# Patient Record
Sex: MALE
[De-identification: names, ages, dates, MRNs, and addresses within clinical notes are randomized per-mention and may not be internally consistent; named-entity substitution may affect disease eponyms.]

---

## 2017-11-22 ENCOUNTER — HOSPITAL ENCOUNTER (INPATIENT)
Dept: HOSPITAL 31 - C.ER | Age: 62
LOS: 12 days | Discharge: HOME | DRG: 541 | End: 2017-12-04
Attending: INTERNAL MEDICINE | Admitting: INTERNAL MEDICINE
Payer: MEDICAID

## 2017-11-22 VITALS — BODY MASS INDEX: 31.3 KG/M2

## 2017-11-22 DIAGNOSIS — I26.99: Primary | ICD-10-CM

## 2017-11-22 DIAGNOSIS — F10.10: ICD-10-CM

## 2017-11-22 DIAGNOSIS — I82.431: ICD-10-CM

## 2017-11-22 DIAGNOSIS — I27.20: ICD-10-CM

## 2017-11-22 DIAGNOSIS — I10: ICD-10-CM

## 2017-11-22 DIAGNOSIS — Z79.01: ICD-10-CM

## 2017-11-22 LAB
ALBUMIN/GLOB SERPL: 1.1 {RATIO} (ref 1–2.1)
ALP SERPL-CCNC: 69 U/L (ref 38–126)
ALT SERPL-CCNC: 71 U/L (ref 21–72)
AST SERPL-CCNC: 56 U/L (ref 17–59)
BACTERIA #/AREA URNS HPF: (no result) /[HPF]
BASOPHILS # BLD AUTO: 0.1 K/UL (ref 0–0.2)
BASOPHILS NFR BLD: 0.5 % (ref 0–2)
BILIRUB SERPL-MCNC: 1 MG/DL (ref 0.2–1.3)
BILIRUB UR-MCNC: NEGATIVE MG/DL
BUN SERPL-MCNC: 24 MG/DL (ref 9–20)
CALCIUM SERPL-MCNC: 8.9 MG/DL (ref 8.6–10.4)
CHLORIDE SERPL-SCNC: 96 MMOL/L (ref 98–107)
CO2 SERPL-SCNC: 22 MMOL/L (ref 22–30)
EOSINOPHIL # BLD AUTO: 0.4 K/UL (ref 0–0.7)
EOSINOPHIL NFR BLD: 2 % (ref 0–4)
EOSINOPHIL NFR BLD: 4.7 % (ref 0–4)
ERYTHROCYTE [DISTWIDTH] IN BLOOD BY AUTOMATED COUNT: 13.2 % (ref 11.5–14.5)
GLOBULIN SER-MCNC: 3.7 GM/DL (ref 2.2–3.9)
GLUCOSE SERPL-MCNC: 139 MG/DL (ref 75–110)
GLUCOSE UR STRIP-MCNC: NORMAL MG/DL
HCT VFR BLD CALC: 50 % (ref 35–51)
HYALINE CASTS #/AREA URNS LPF: (no result) /LPF (ref 0–2)
INR PPP: 1.2
KETONES UR STRIP-MCNC: NEGATIVE MG/DL
LEUKOCYTE ESTERASE UR-ACNC: (no result) LEU/UL
LYMPHOCYTES # BLD AUTO: 0.8 K/UL (ref 1–4.3)
LYMPHOCYTES NFR BLD AUTO: 8.4 % (ref 20–40)
MCH RBC QN AUTO: 32 PG (ref 27–31)
MCHC RBC AUTO-ENTMCNC: 34.2 G/DL (ref 33–37)
MCV RBC AUTO: 93.5 FL (ref 80–94)
MONOCYTES # BLD: 0.6 K/UL (ref 0–0.8)
MONOCYTES NFR BLD: 6.6 % (ref 0–10)
NEUTROPHILS NFR BLD AUTO: 85 % (ref 50–75)
NRBC BLD AUTO-RTO: 0.1 % (ref 0–2)
PH UR STRIP: 5 [PH] (ref 5–8)
PLATELET # BLD: 225 K/UL (ref 130–400)
PMV BLD AUTO: 8.3 FL (ref 7.2–11.7)
POTASSIUM SERPL-SCNC: 4.2 MMOL/L (ref 3.6–5.2)
PROT SERPL-MCNC: 7.7 G/DL (ref 6.3–8.3)
PROT UR STRIP-MCNC: NEGATIVE MG/DL
RBC # UR STRIP: NEGATIVE /UL
RBC #/AREA URNS HPF: < 1 /HPF (ref 0–3)
SODIUM SERPL-SCNC: 136 MMOL/L (ref 132–148)
SP GR UR STRIP: 1.01 (ref 1–1.03)
TOTAL CELLS COUNTED BLD: 100
TROPONIN I SERPL-MCNC: 0.23 NG/ML (ref 0–0.12)
UROBILINOGEN UR-MCNC: NORMAL MG/DL (ref 0.2–1)
WBC # BLD AUTO: 9.3 K/UL (ref 4.8–10.8)
WBC #/AREA URNS HPF: < 1 /HPF (ref 0–5)

## 2017-11-22 NOTE — C.PDOC
History Of Present Illness


63 y/o male, with PMHx of HTN, presents to ED c/o shortness of breath for the 

past 1-2 months but worse in the last few days. Notes that his symptoms are 

worse when walking. Pt reports occasional twinge in his chest. Pt denies any 

chest pain at this time, fever, palpitations, leg swelling, or other 

complaints. 


PMD: Dr. Nolen





Time Seen by Provider: 17 15:05


Chief Complaint (Nursing): Shortness Of Breath


History Per: Patient


History/Exam Limitations: no limitations


Onset/Duration Of Symptoms: Days


Current Symptoms Are (Timing): Still Present


Associated Symptoms: denies: Fever, Chills, Sweating, Bloody Cough, Heart Racing

, Leg/Calf Pain, Ankle/Leg Swelling, Dizziness, Light-headedness, Anxiety, 

Tingling In Hands Or Face, Musle Spasms In Hands Or Feet


Recent travel outside of the [place]: No


Additional History Per: Patient





Past Medical History


Reviewed: Historical Data, Nursing Documentation, Vital Signs


Vital Signs: 


 Last Vital Signs











Temp  98.2 F   17 14:51


 


Pulse  100 H  17 17:23


 


Resp  18   17 17:23


 


BP  143/114 H  17 17:23


 


Pulse Ox  95   17 17:45














- Medical History


PMH: HTN


   Denies: Chronic Kidney Disease


Family History: States: Unknown Family Hx





- Social History


Hx Alcohol Use: Yes


Hx Substance Use: No





- Immunization History


Hx Tetanus Toxoid Vaccination: No


Hx Influenza Vaccination: No


Hx Pneumococcal Vaccination: No





Review Of Systems


Except As Marked, All Systems Reviewed And Found Negative.


Constitutional: Negative for: Fever, Chills


Cardiovascular: Negative for: Chest Pain, Palpitations, Edema, Light Headedness


Respiratory: Positive for: Shortness of Breath.  Negative for: Cough, Sputum


Gastrointestinal: Negative for: Nausea, Vomiting, Abdominal Pain


Neurological: Negative for: Headache, Dizziness





Physical Exam





- Physical Exam


Appears: Non-toxic, No Acute Distress, Other (obese)


Skin: Normal Color, Warm, Dry


Head: Atraumatic, Normacephalic


Eye(s): bilateral: Normal Inspection


Oral Mucosa: Moist


Neck: Supple


Chest: Symmetrical


Cardiovascular: Rhythm Regular (tachycardic), No Murmur


Respiratory: Normal Breath Sounds, No Accessory Muscle Use, No Rales, No Rhonchi

, No Wheezing


Gastrointestinal/Abdominal: Soft, No Tenderness


Extremity: Normal ROM, No Pedal Edema, No Swelling


Neurological/Psych: Oriented x3, Normal Speech





ED Course And Treatment





- Laboratory Results


Result Diagrams: 


 17 15:35





 17 15:35


Lab Interpretation: No Acute Changes


ECG: Interpreted By Me


ECG Rhythm: Sinus Tachycardia, Nonspecific Changes


Rate From EC


O2 Sat by Pulse Oximetry: 95


Pulse Ox Interpretation: Normal





- Radiology


CXR: Viewed By Me, Read By Radiologist


CXR Interpretation: Yes: Infiltrates





- Other Rad


  ** No standard instances


Interpretation: FINDINGS:  LUNGS:  Hazy heterogeneous opacity noted at the left 

lower lobe. The possibility of pneumonia cannot be excluded.  PLEURA:  Blunting 

of the left costophrenic angle suspicious for small pleural effusion.  

CARDIOVASCULAR:  Normal.  OSSEOUS STRUCTURES:  No significant abnormalities.  

VISUALIZED UPPER ABDOMEN:  Normal.  OTHER FINDINGS:  None.  IMPRESSION:  Hazy 

opacity at the right lower lung likely associated with small left pleural 

effusion. Correlate clinically for pneumonia.





- CT Scan/US


  ** No standard instances


Other Rad Studies (CT/US): Read By Radiologist, Radiology Report Reviewed


CT/US Interpretation: Findings:  Visualized portions of the inferior thyroid 

gland appear unremarkable. The mediastinal and hilar vascular structures appear 

within normal limits.  The heart appears within normal limits of size. Coronary 

artery calcifications.  Large bilateral pulmonary emboli involving both the 

right and left main pulmonary arteries and subsequent distal branches.  No 

focal consolidation. No pleural effusion. No pneumothorax. 4 mm nodule inferior 

left upper lobe (series 4, image 52).  Limited visualized portions of the upper 

abdomen appear grossly unremarkable.  Bilateral gynecomastia.  Osseous 

abnormalities particularly involving the posterior medial aspect of the left 

6th and 7th ribs ; correlate for remote injury.  Impression:  Large bilateral 

pulmonary emboli involving both the right and left main pulmonary arteries and 

subsequent distal branches.  4 mm nodule inferior left upper lobe. Recommend 

twelve month follow-up CT to assess for stability.  Additional incidental 

findings as above.


Progress Note: Blood work, UA, CXR, EKG ordered and reviewed.  Treated with 

Heparin Bolus and drip


Reassessment Condition: Unchanged





- Physician Consult Information


Physician Contacted: Alpa Nolen


Outcome Of Conversation: admit





Medical Decision Making


Medical Decision Making: 





Case discussed with radiology who request documentation for CTA with Cr 1.7





Benefits of CTA out weigh risks





Patient has acute dyspmea and D-Dimer > 2000 and needed emergent CTA 





treated with IVF NSS





Disposition


Discussed With .: Alpa Nolen


Doctor Will See Patient In The: Hospital





- Disposition


Disposition: HOSPITALIZED


Disposition Time: 17:00


Condition: STABLE


Instructions:  Pulmonary Embolism (GEN)


Forms:  [facility] (English)





- POA


Present On Arrival: None





- Clinical Impression


Clinical Impression: 


 Pulmonary emboli








- PA / NP / Resident Statement


MD/DO has reviewed & agrees with the documentation as recorded.





- Scribe Statement


The provider has reviewed the documentation as recorded by the Scribe


Ross Winn





All medical record entries made by the Addieibcong were at my direction and 

personally dictated by me. I have reviewed the chart and agree that the record 

accurately reflects my personal performance of the history, physical exam, 

medical decision making, and the department course for this patient. I have 

also personally directed, reviewed, and agree with the discharge instructions 

and disposition.

## 2017-11-22 NOTE — CP.PCM.CON
History of Present Illness





- History of Present Illness


History of Present Illness: 





62 M with h/o HTN, alcohol abuse came to ER due to worsening sob last 4 days to 

the extent couldn't walk 1/2 block and could not make 3 flights of stairs which 

he does normally for his house. In ER elevated ddimer was noted, PE study 

showed b/l proximal PE. EKG showed q and inverted t in lead 3 and lateral leads

, troponin, 0.22. Patient is comfortable at rest, on nc, maintaining spo2 in 97%

, BP on higher side, hr mid 90's. Patient admits drinking 6-10 24 oz beers for 

years. On questioning mentions about 1 month back had pain behind right knee 

and mid thigh, denies prolonged immobility, but generally he is sedentary, also 

mentions episode of mild diarrhea 3 days back.





PMH as above


PSH surg in left lung 20 yrs back for infection, tonsillectomy


Social lives alone, denies smoking, illicit drugs, but alcohol as above


Allergies NKDA


Family history Father had heart related problem at age 45, mom  age 70, 

patient doesn't know the cause


Meds Lisinopril/HCTZ 20/25 daily





Patient's creatinine noticed 1.7mg/dl, no prior levels to compare





Review of Systems





- Review of Systems


All systems: reviewed and no additional remarkable complaints except (HPI)





Past Patient History





- Past Social History


Smoking Status: Never Smoked


Alcohol: > 2 Drinks/Day


Home Situation {Lives}: Alone


Domestic Violence: Negative





- CARDIAC


Hx Hypertension: Yes





- PULMONARY


Hx Respiratory Disorders: No





- NEUROLOGICAL


Hx Neurological Disorder: No





- HEENT


Hx HEENT Problems: No





- RENAL


Hx Chronic Kidney Disease: No





- ENDOCRINE/METABOLIC


Hx Endocrine Disorders: No





- HEMATOLOGICAL/ONCOLOGICAL


Hx Blood Disorders: No





- INTEGUMENTARY


Hx Dermatological Problems: No





- MUSCULOSKELETAL/RHEUMATOLOGICAL


Hx Musculoskeletal Disorders: No





- GASTROINTESTINAL


Hx Gastrointestinal Disorders: No





- GENITOURINARY/GYNECOLOGICAL


Hx Genitourinary Disorders: No





- PSYCHIATRIC


Hx Substance Use: No





- SURGICAL HISTORY


Hx Surgeries: Yes


Other/Comment: as per patient, "years ago when I had pneumonia. A surgery in my 

lungs, I'm not sure what"





- ANESTHESIA


Hx Anesthesia: Yes


Hx Anesthesia Reactions: No





Meds


Allergies/Adverse Reactions: 


 Allergies











Allergy/AdvReac Type Severity Reaction Status Date / Time


 


No Known Allergies Allergy   Verified 17 14:52














- Medications


Medications: 


 Current Medications





Chlordiazepoxide (Librium)  25 mg PO Q8 [facility]


Folic Acid (Folic Acid)  1 mg PO DAILY [facility]


Heparin Sodium/Sodium Chloride (Heparin 65574 Units/250ml 1/2 Normal Saline)  25

,000 units in 250 mls @ 17.962 mls/hr IV .[id number] PRN; Protocol; 18 UNITS/KG/HR


   PRN Reason: ADJUST RATE PER PROTOCOL


   Last Admin: 17 17:44 Dose:  17.962 mls/hr


Multivitamins/Vitamin C (Multi-Delyn Liquid)  5 ml PO DAILY [facility]


Pantoprazole Sodium (Protonix Ec Tab)  40 mg PO DAILY [facility]


Thiamine HCl (Vitamin B1 Tab)  100 mg PO DAILY [facility]











Physical Exam





- Additional Findings


Additional findings: 





* HEENT SANA, port wine stain on the chin


* Neck Supple


* CVS regular, no gallop or rub


* Chest clear b/l


* PA soft, nt, bs present


* Ext no edema


* Skin soft, dry, normal turgor


* CNS awake oriented x3 no fnd





Results





- Vital Signs


Recent Vital Signs: 


 Last Vital Signs











Temp  98.8 F   17 20:46


 


Pulse  95 H  17 20:46


 


Resp  20   17 20:46


 


BP  139/115 H  17 20:46


 


Pulse Ox  98   17 20:46














- Labs


Result Diagrams: 


 17 15:35





 17 15:35


Labs: 


 Laboratory Results - last 24 hr











  17





  15:35 15:35 15:35


 


WBC  9.3  


 


RBC  5.35  


 


Hgb  17.1  


 


Hct  50.0  


 


MCV  93.5  


 


MCH  32.0 H  


 


MCHC  34.2  


 


RDW  13.2  


 


Plt Count  225  


 


MPV  8.3  


 


Neut % (Auto)  79.8 H  


 


Lymph % (Auto)  8.4 L  


 


Mono % (Auto)  6.6  


 


Eos % (Auto)  4.7 H  


 


Baso % (Auto)  0.5  


 


Neut #  7.4 H  


 


Lymph #  0.8 L  


 


Mono #  0.6  


 


Eos #  0.4  


 


Baso #  0.1  


 


Neutrophils % (Manual)  85 H  


 


Lymphocytes % (Manual)  9 L  


 


Monocytes % (Manual)  4  


 


Eosinophils % (Manual)  2  


 


Platelet Estimate  Normal  


 


RBC Morphology  Normal  


 


PT   


 


INR   


 


APTT   


 


D-Dimer, Quantitative   2736 H 


 


Sodium    136


 


Potassium    4.2


 


Chloride    96 L


 


Carbon Dioxide    22


 


Anion Gap    23 H


 


BUN    24 H


 


Creatinine    1.7 H


 


Est GFR ( Amer)    49


 


Est GFR (Non-Af Amer)    41


 


Random Glucose    139 H


 


Calcium    8.9


 


Total Bilirubin    1.0


 


AST    56


 


ALT    71


 


Alkaline Phosphatase    69


 


CK-MB (Mass)    3.30


 


Troponin I    0.2300 H*


 


NT-Pro-B Natriuret Pep    4700 H


 


Total Protein    7.7


 


Albumin    4.0


 


Globulin    3.7


 


Albumin/Globulin Ratio    1.1


 


Lipase    44


 


Urine Color   


 


Urine Clarity   


 


Urine pH   


 


Ur Specific Gravity   


 


Urine Protein   


 


Urine Glucose (UA)   


 


Urine Ketones   


 


Urine Blood   


 


Urine Nitrate   


 


Urine Bilirubin   


 


Urine Urobilinogen   


 


Ur Leukocyte Esterase   


 


Urine WBC (Auto)   


 


Urine RBC (Auto)   


 


Urine Bacteria   


 


Hyaline Casts   














  17





  16:05 17:20 17:51


 


WBC   


 


RBC   


 


Hgb   


 


Hct   


 


MCV   


 


MCH   


 


MCHC   


 


RDW   


 


Plt Count   


 


MPV   


 


Neut % (Auto)   


 


Lymph % (Auto)   


 


Mono % (Auto)   


 


Eos % (Auto)   


 


Baso % (Auto)   


 


Neut #   


 


Lymph #   


 


Mono #   


 


Eos #   


 


Baso #   


 


Neutrophils % (Manual)   


 


Lymphocytes % (Manual)   


 


Monocytes % (Manual)   


 


Eosinophils % (Manual)   


 


Platelet Estimate   


 


RBC Morphology   


 


PT  13.0 H  


 


INR  1.2  


 


APTT    32


 


D-Dimer, Quantitative   


 


Sodium   


 


Potassium   


 


Chloride   


 


Carbon Dioxide   


 


Anion Gap   


 


BUN   


 


Creatinine   


 


Est GFR ( Amer)   


 


Est GFR (Non-Af Amer)   


 


Random Glucose   


 


Calcium   


 


Total Bilirubin   


 


AST   


 


ALT   


 


Alkaline Phosphatase   


 


CK-MB (Mass)   


 


Troponin I   


 


NT-Pro-B Natriuret Pep   


 


Total Protein   


 


Albumin   


 


Globulin   


 


Albumin/Globulin Ratio   


 


Lipase   


 


Urine Color   Straw 


 


Urine Clarity   Clear 


 


Urine pH   5.0 


 


Ur Specific Gravity   1.006 


 


Urine Protein   Negative 


 


Urine Glucose (UA)   Normal 


 


Urine Ketones   Negative 


 


Urine Blood   Negative 


 


Urine Nitrate   Negative 


 


Urine Bilirubin   Negative 


 


Urine Urobilinogen   Normal 


 


Ur Leukocyte Esterase   Neg 


 


Urine WBC (Auto)   < 1 


 


Urine RBC (Auto)   < 1 


 


Urine Bacteria   Rare 


 


Hyaline Casts   6-10 H 














Assessment & Plan





- Assessment and Plan (Free Text)


Assessment: 





* B/l proximal PE with signs of RV strain, not in shock + troponin, symptoms, 

PE burden, EKG [id number], prolonged qt


* H/o htn


* Renal insufficiency creat 1.7, unknown prior lab, patient also received IV 

contrast of PE study


* Alcohol abuse


Plan: 





* Heparin drip


* Venous doppler


* Echo


* Cardiology consult for possible ECHOs procedure for sub-massive PE to reduce 

morbidity related with pulm htn, spoke to Dr. Teague


* Librium, prn aitvan, thiamine, mvt, FA


* Monitor labs including electrolytes, creat, trop, may hold on lisinopril/hctz 

until renal picture clears up.


* GI prophylaxis


* See orders for detail. 


* Discussed case with Dr. Nolen as well.

## 2017-11-22 NOTE — CT
CTA chest PE protocol 



Indication: Dyspnea 



Technique: 



Contiguous axial images were obtained through the chest with 

intravenous contrast enhancement. Sagittal and coronal 

reconstructions were generated and reviewed. 



This CT exam was performed using 1 or more of the falling dose 

reduction techniques: Automated exposure control, adjustment of the 

MAA and/or kV according to patient size, and/or use of iterative 

reconstruction technique. 



IV Contrast: 100 mL Visipaque 







Radiation dose (DLP): 522.03 MGy-cm. 



Comparison: Chest x-ray performed 11/22/17 



Findings: 



Visualized portions of the inferior thyroid gland appear 

unremarkable. The mediastinal and hilar vascular structures appear 

within normal limits.  The heart appears within normal limits of 

size. Coronary artery calcifications. 



Large bilateral pulmonary emboli involving both the right and left 

main pulmonary arteries and subsequent distal branches. 



No focal consolidation. No pleural effusion. No pneumothorax. 4 mm 

nodule inferior left upper lobe (series 4, image 52). 



Limited visualized portions of the upper abdomen appear grossly 

unremarkable. 



Bilateral gynecomastia. 



Osseous abnormalities particularly involving the posterior medial 

aspect of the left 6th and 7th ribs ; correlate for remote injury. 



Impression: 



Large bilateral pulmonary emboli involving both the right and left 

main pulmonary arteries and subsequent distal branches. 



4 mm nodule inferior left upper lobe. Recommend twelve month 

follow-up CT to assess for stability. 



Additional incidental findings as above. 



Findings discussed with BRAULIO Schmitt on 11/22/17 at 5:20 p.m.

## 2017-11-22 NOTE — RAD
HISTORY:

SOB  



COMPARISON:

No priors



TECHNIQUE:

Chest PA and lateral



FINDINGS:



LUNGS:

Hazy heterogeneous opacity noted at the left lower lobe. The 

possibility of pneumonia cannot be excluded.



PLEURA:

Blunting of the left costophrenic angle suspicious for small pleural 

effusion.



CARDIOVASCULAR:

Normal.



OSSEOUS STRUCTURES:

No significant abnormalities.



VISUALIZED UPPER ABDOMEN:

Normal.



OTHER FINDINGS:

None.



IMPRESSION:

Hazy opacity at the right lower lung likely associated with small 

left pleural effusion. Correlate clinically for pneumonia.

## 2017-11-23 LAB
ALBUMIN/GLOB SERPL: 1.4 {RATIO} (ref 1–2.1)
ALP SERPL-CCNC: 56 U/L (ref 38–126)
ALT SERPL-CCNC: 50 U/L (ref 21–72)
AST SERPL-CCNC: 33 U/L (ref 17–59)
BASOPHILS # BLD AUTO: 0 K/UL (ref 0–0.2)
BASOPHILS NFR BLD: 0.6 % (ref 0–2)
BILIRUB SERPL-MCNC: 1.1 MG/DL (ref 0.2–1.3)
BUN SERPL-MCNC: 23 MG/DL (ref 9–20)
CALCIUM SERPL-MCNC: 8.2 MG/DL (ref 8.6–10.4)
CHLORIDE SERPL-SCNC: 101 MMOL/L (ref 98–107)
CO2 SERPL-SCNC: 25 MMOL/L (ref 22–30)
EOSINOPHIL # BLD AUTO: 0.4 K/UL (ref 0–0.7)
EOSINOPHIL NFR BLD: 6.5 % (ref 0–4)
ERYTHROCYTE [DISTWIDTH] IN BLOOD BY AUTOMATED COUNT: 13.1 % (ref 11.5–14.5)
GLOBULIN SER-MCNC: 2.4 GM/DL (ref 2.2–3.9)
GLUCOSE SERPL-MCNC: 87 MG/DL (ref 75–110)
HCT VFR BLD CALC: 46.5 % (ref 35–51)
LYMPHOCYTES # BLD AUTO: 1.1 K/UL (ref 1–4.3)
LYMPHOCYTES NFR BLD AUTO: 16.6 % (ref 20–40)
MAGNESIUM SERPL-MCNC: 1.1 MG/DL (ref 1.6–2.3)
MCH RBC QN AUTO: 31.7 PG (ref 27–31)
MCHC RBC AUTO-ENTMCNC: 33.5 G/DL (ref 33–37)
MCV RBC AUTO: 94.6 FL (ref 80–94)
MONOCYTES # BLD: 0.6 K/UL (ref 0–0.8)
MONOCYTES NFR BLD: 8.6 % (ref 0–10)
NRBC BLD AUTO-RTO: 0.2 % (ref 0–2)
PLATELET # BLD: 190 K/UL (ref 130–400)
PMV BLD AUTO: 8.7 FL (ref 7.2–11.7)
POTASSIUM SERPL-SCNC: 4 MMOL/L (ref 3.6–5.2)
PROT SERPL-MCNC: 5.8 G/DL (ref 6.3–8.3)
SODIUM SERPL-SCNC: 135 MMOL/L (ref 132–148)
WBC # BLD AUTO: 6.6 K/UL (ref 4.8–10.8)

## 2017-11-23 RX ADMIN — HEPARIN SODIUM PRN MLS/HR: 10000 INJECTION, SOLUTION INTRAVENOUS at 02:45

## 2017-11-23 RX ADMIN — PANTOPRAZOLE SODIUM SCH MG: 40 TABLET, DELAYED RELEASE ORAL at 09:08

## 2017-11-23 RX ADMIN — Medication SCH TAB: at 09:08

## 2017-11-23 RX ADMIN — HEPARIN SODIUM PRN MLS/HR: 10000 INJECTION, SOLUTION INTRAVENOUS at 10:39

## 2017-11-23 NOTE — CP.CCUPN
CCU Subjective





- Physician Review


Subjective (Free Text): 


Patient is hemodynamically stable. on IV heparin, continue supportive measure, 

TROP (+), pro-BNP high


11/23/17 08:03








CCU Objective





- Vital Signs / Intake & Output


Vital Signs (Last 4 hours): 


Vital Signs











  Pulse Resp BP Pulse Ox


 


 11/23/17 07:00  87  16  123/92 H  97


 


 11/23/17 06:00  88  15  124/100 H  99


 


 11/23/17 05:00  90  14  115/74  91 L











Intake and Output (Last 8hrs): 


 Intake & Output











 11/22/17 11/23/17 11/23/17





 22:59 06:59 14:59


 


Intake Total 154.6 349.5 14.4


 


Output Total 0 400 


 


Balance 154.6 -50.5 14.4


 


Weight 212 lb 1.355 oz  


 


Intake:   


 


  Intake, IV Amount 34.6 109.5 14.4


 


    Right Antecubital 34.6 109.5 14.4


 


  Oral 120 240 


 


Output:   


 


  Urine 0 400 


 


    Urine, Voided 0 400 


 


Other:   


 


  Voiding Method Urinal  


 


  # Voids   


 


    Urine, Voided  1 














- Physical Exam


Physical Exam Limitations: Negative for: Altered Mental Status


Head: Positive for: Atraumatic, Normocephalic


Mouth: Positive for: Moist Mucous Membranes


Neck: Positive for: Normal Range of Motion


Respiratory/Chest: Positive for: Clear to Auscultation, Good Air Exchange.  

Negative for: Respiratory Distress


Cardiovascular: Positive for: Regular Rate and Rhythm, Normal S1, S2


Lower Extremity: Positive for: Normal Inspection





- Medications


Active Medications: 


Active Medications











Generic Name Dose Route Start Last Admin





  Trade Name Freq  PRN Reason Stop Dose Admin


 


Chlordiazepoxide  25 mg  11/22/17 22:00  11/23/17 05:35





  Librium  PO   25 mg





  Q8 ITA   Administration


 


Folic Acid  1 mg  11/23/17 10:00  





  Folic Acid  PO   





  DAILY [facility]   


 


Heparin Sodium/Sodium Chloride  25,000 units in 250 mls @ 17.316 mls/hr  11/22/ 17 21:17  11/23/17 02:45





  Heparin 19127 Units/250ml 1/2 Normal Saline  IV   15 units/kg/hr





  .[id number] PRN   14.43 mls/hr





  PROTOCOL   Administration





  Protocol   





  18 UNITS/KG/HR   


 


Magnesium Sulfate/Dextrose  1 gm in 100 mls @ 200 mls/hr  11/23/17 07:58  





  Magnesium Sulfate 1 Gm/100 Ml D5w  IVPB  11/23/17 08:27  





  ONCE ONE   


 


Multivitamins  1 tab  11/23/17 10:00  





  Hexavitamin  PO   





  DAILY [facility]   


 


Pantoprazole Sodium  40 mg  11/23/17 10:00  





  Protonix Ec Tab  PO   





  DAILY [facility]   


 


Thiamine HCl  100 mg  11/22/17 21:10  11/22/17 21:45





  Vitamin B1 Tab  PO   100 mg





  DAILY [facility]   Administration














- Patient Studies


Lab Studies: 


 Lab Studies











  11/23/17 11/23/17 11/23/17 Range/Units





  06:16 06:16 00:05 


 


WBC   6.6   (4.8-10.8)  K/uL


 


RBC   4.91   (4.40-5.90)  Mil/uL


 


Hgb   15.6   (12.0-18.0)  g/dL


 


Hct   46.5   (35.0-51.0)  %


 


MCV   94.6 H   (80.0-94.0)  fL


 


MCH   31.7 H   (27.0-31.0)  pg


 


MCHC   33.5   (33.0-37.0)  g/dL


 


RDW   13.1   (11.5-14.5)  %


 


Plt Count   190   (130-400)  K/uL


 


MPV   8.7   (7.2-11.7)  fL


 


Neut % (Auto)   67.7   (50.0-75.0)  %


 


Lymph % (Auto)   16.6 L   (20.0-40.0)  %


 


Mono % (Auto)   8.6   (0.0-10.0)  %


 


Eos % (Auto)   6.5 H   (0.0-4.0)  %


 


Baso % (Auto)   0.6   (0.0-2.0)  %


 


Neut #   4.5   (1.8-7.0)  K/uL


 


Lymph #   1.1   (1.0-4.3)  K/uL


 


Mono #   0.6   (0.0-0.8)  K/uL


 


Eos #   0.4   (0.0-0.7)  K/uL


 


Baso #   0.0   (0.0-0.2)  K/uL


 


Neutrophils % (Manual)     (50-75)  %


 


Lymphocytes % (Manual)     (20-40)  %


 


Monocytes % (Manual)     (0-10)  %


 


Eosinophils % (Manual)     (0-4)  %


 


Platelet Estimate     (NORMAL)  


 


RBC Morphology     


 


PT     (9.7-12.2)  SECONDS


 


INR     


 


APTT    164 H* D  (21-34)  SECONDS


 


D-Dimer, Quantitative     (0-243)  ng/mlDDU


 


Sodium  135    (132-148)  mmol/L


 


Potassium  4.0    (3.6-5.2)  mmol/L


 


Chloride  101    ()  mmol/L


 


Carbon Dioxide  25    (22-30)  mmol/L


 


Anion Gap  13    (10-20)  


 


BUN  23 H    (9-20)  mg/dL


 


Creatinine  1.4    (0.8-1.5)  mg/dL


 


Est GFR (African Amer)  > 60    


 


Est GFR (Non-Af Amer)  51    


 


Random Glucose  87    ()  mg/dL


 


Calcium  8.2 L    (8.6-10.4)  mg/dl


 


Magnesium  1.1 L    (1.6-2.3)  mg/dL


 


Total Bilirubin  1.1    (0.2-1.3)  mg/dL


 


AST  33    (17-59)  U/L


 


ALT  50    (21-72)  U/L


 


Alkaline Phosphatase  56    ()  U/L


 


CK-MB (Mass)     (0.0-3.38)  ng/mL


 


Troponin I  0.1370 H*    (0.00-0.120)  ng/mL


 


NT-Pro-B Natriuret Pep     (0-900)  pg/mL


 


Total Protein  5.8 L    (6.3-8.3)  g/dL


 


Albumin  3.4 L    (3.5-5.0)  g/dL


 


Globulin  2.4    (2.2-3.9)  gm/dL


 


Albumin/Globulin Ratio  1.4    (1.0-2.1)  


 


Lipase     ()  U/L


 


Urine Color     (YELLOW)  


 


Urine Clarity     (Clear)  


 


Urine pH     (5.0-8.0)  


 


Ur Specific Gravity     (1.003-1.030)  


 


Urine Protein     (NEGATIVE)  mg/dL


 


Urine Glucose (UA)     (Normal)  mg/dL


 


Urine Ketones     (NEGATIVE)  mg/dL


 


Urine Blood     (NEGATIVE)  


 


Urine Nitrate     (NEGATIVE)  


 


Urine Bilirubin     (NEGATIVE)  


 


Urine Urobilinogen     (0.2-1.0)  mg/dL


 


Ur Leukocyte Esterase     (Negative)  Marisabel/uL


 


Urine WBC (Auto)     (0-5)  /hpf


 


Urine RBC (Auto)     (0-3)  /hpf


 


Urine Bacteria     (<OCC)  


 


Hyaline Casts     (0-2)  /lpf














  11/22/17 11/22/17 11/22/17 Range/Units





  17:51 17:20 16:05 


 


WBC     (4.8-10.8)  K/uL


 


RBC     (4.40-5.90)  Mil/uL


 


Hgb     (12.0-18.0)  g/dL


 


Hct     (35.0-51.0)  %


 


MCV     (80.0-94.0)  fL


 


MCH     (27.0-31.0)  pg


 


MCHC     (33.0-37.0)  g/dL


 


RDW     (11.5-14.5)  %


 


Plt Count     (130-400)  K/uL


 


MPV     (7.2-11.7)  fL


 


Neut % (Auto)     (50.0-75.0)  %


 


Lymph % (Auto)     (20.0-40.0)  %


 


Mono % (Auto)     (0.0-10.0)  %


 


Eos % (Auto)     (0.0-4.0)  %


 


Baso % (Auto)     (0.0-2.0)  %


 


Neut #     (1.8-7.0)  K/uL


 


Lymph #     (1.0-4.3)  K/uL


 


Mono #     (0.0-0.8)  K/uL


 


Eos #     (0.0-0.7)  K/uL


 


Baso #     (0.0-0.2)  K/uL


 


Neutrophils % (Manual)     (50-75)  %


 


Lymphocytes % (Manual)     (20-40)  %


 


Monocytes % (Manual)     (0-10)  %


 


Eosinophils % (Manual)     (0-4)  %


 


Platelet Estimate     (NORMAL)  


 


RBC Morphology     


 


PT    13.0 H  (9.7-12.2)  SECONDS


 


INR    1.2  


 


APTT  32    (21-34)  SECONDS


 


D-Dimer, Quantitative     (0-243)  ng/mlDDU


 


Sodium     (132-148)  mmol/L


 


Potassium     (3.6-5.2)  mmol/L


 


Chloride     ()  mmol/L


 


Carbon Dioxide     (22-30)  mmol/L


 


Anion Gap     (10-20)  


 


BUN     (9-20)  mg/dL


 


Creatinine     (0.8-1.5)  mg/dL


 


Est GFR (African Amer)     


 


Est GFR (Non-Af Amer)     


 


Random Glucose     ()  mg/dL


 


Calcium     (8.6-10.4)  mg/dl


 


Magnesium     (1.6-2.3)  mg/dL


 


Total Bilirubin     (0.2-1.3)  mg/dL


 


AST     (17-59)  U/L


 


ALT     (21-72)  U/L


 


Alkaline Phosphatase     ()  U/L


 


CK-MB (Mass)     (0.0-3.38)  ng/mL


 


Troponin I     (0.00-0.120)  ng/mL


 


NT-Pro-B Natriuret Pep     (0-900)  pg/mL


 


Total Protein     (6.3-8.3)  g/dL


 


Albumin     (3.5-5.0)  g/dL


 


Globulin     (2.2-3.9)  gm/dL


 


Albumin/Globulin Ratio     (1.0-2.1)  


 


Lipase     ()  U/L


 


Urine Color   Straw   (YELLOW)  


 


Urine Clarity   Clear   (Clear)  


 


Urine pH   5.0   (5.0-8.0)  


 


Ur Specific Gravity   1.006   (1.003-1.030)  


 


Urine Protein   Negative   (NEGATIVE)  mg/dL


 


Urine Glucose (UA)   Normal   (Normal)  mg/dL


 


Urine Ketones   Negative   (NEGATIVE)  mg/dL


 


Urine Blood   Negative   (NEGATIVE)  


 


Urine Nitrate   Negative   (NEGATIVE)  


 


Urine Bilirubin   Negative   (NEGATIVE)  


 


Urine Urobilinogen   Normal   (0.2-1.0)  mg/dL


 


Ur Leukocyte Esterase   Neg   (Negative)  Marisabel/uL


 


Urine WBC (Auto)   < 1   (0-5)  /hpf


 


Urine RBC (Auto)   < 1   (0-3)  /hpf


 


Urine Bacteria   Rare   (<OCC)  


 


Hyaline Casts   6-10 H   (0-2)  /lpf














  11/22/17 11/22/17 11/22/17 Range/Units





  15:35 15:35 15:35 


 


WBC    9.3  (4.8-10.8)  K/uL


 


RBC    5.35  (4.40-5.90)  Mil/uL


 


Hgb    17.1  (12.0-18.0)  g/dL


 


Hct    50.0  (35.0-51.0)  %


 


MCV    93.5  (80.0-94.0)  fL


 


MCH    32.0 H  (27.0-31.0)  pg


 


MCHC    34.2  (33.0-37.0)  g/dL


 


RDW    13.2  (11.5-14.5)  %


 


Plt Count    225  (130-400)  K/uL


 


MPV    8.3  (7.2-11.7)  fL


 


Neut % (Auto)    79.8 H  (50.0-75.0)  %


 


Lymph % (Auto)    8.4 L  (20.0-40.0)  %


 


Mono % (Auto)    6.6  (0.0-10.0)  %


 


Eos % (Auto)    4.7 H  (0.0-4.0)  %


 


Baso % (Auto)    0.5  (0.0-2.0)  %


 


Neut #    7.4 H  (1.8-7.0)  K/uL


 


Lymph #    0.8 L  (1.0-4.3)  K/uL


 


Mono #    0.6  (0.0-0.8)  K/uL


 


Eos #    0.4  (0.0-0.7)  K/uL


 


Baso #    0.1  (0.0-0.2)  K/uL


 


Neutrophils % (Manual)    85 H  (50-75)  %


 


Lymphocytes % (Manual)    9 L  (20-40)  %


 


Monocytes % (Manual)    4  (0-10)  %


 


Eosinophils % (Manual)    2  (0-4)  %


 


Platelet Estimate    Normal  (NORMAL)  


 


RBC Morphology    Normal  


 


PT     (9.7-12.2)  SECONDS


 


INR     


 


APTT     (21-34)  SECONDS


 


D-Dimer, Quantitative   2736 H   (0-243)  ng/mlDDU


 


Sodium  136    (132-148)  mmol/L


 


Potassium  4.2    (3.6-5.2)  mmol/L


 


Chloride  96 L    ()  mmol/L


 


Carbon Dioxide  22    (22-30)  mmol/L


 


Anion Gap  23 H    (10-20)  


 


BUN  24 H    (9-20)  mg/dL


 


Creatinine  1.7 H    (0.8-1.5)  mg/dL


 


Est GFR ( Amer)  49    


 


Est GFR (Non-Af Amer)  41    


 


Random Glucose  139 H    ()  mg/dL


 


Calcium  8.9    (8.6-10.4)  mg/dl


 


Magnesium     (1.6-2.3)  mg/dL


 


Total Bilirubin  1.0    (0.2-1.3)  mg/dL


 


AST  56    (17-59)  U/L


 


ALT  71    (21-72)  U/L


 


Alkaline Phosphatase  69    ()  U/L


 


CK-MB (Mass)  3.30    (0.0-3.38)  ng/mL


 


Troponin I  0.2300 H*    (0.00-0.120)  ng/mL


 


NT-Pro-B Natriuret Pep  4700 H    (0-900)  pg/mL


 


Total Protein  7.7    (6.3-8.3)  g/dL


 


Albumin  4.0    (3.5-5.0)  g/dL


 


Globulin  3.7    (2.2-3.9)  gm/dL


 


Albumin/Globulin Ratio  1.1    (1.0-2.1)  


 


Lipase  44    ()  U/L


 


Urine Color     (YELLOW)  


 


Urine Clarity     (Clear)  


 


Urine pH     (5.0-8.0)  


 


Ur Specific Gravity     (1.003-1.030)  


 


Urine Protein     (NEGATIVE)  mg/dL


 


Urine Glucose (UA)     (Normal)  mg/dL


 


Urine Ketones     (NEGATIVE)  mg/dL


 


Urine Blood     (NEGATIVE)  


 


Urine Nitrate     (NEGATIVE)  


 


Urine Bilirubin     (NEGATIVE)  


 


Urine Urobilinogen     (0.2-1.0)  mg/dL


 


Ur Leukocyte Esterase     (Negative)  Marisabel/uL


 


Urine WBC (Auto)     (0-5)  /hpf


 


Urine RBC (Auto)     (0-3)  /hpf


 


Urine Bacteria     (<OCC)  


 


Hyaline Casts     (0-2)  /lpf








 Laboratory Results - last 24 hr











  11/22/17 11/22/17 11/22/17





  15:35 15:35 15:35


 


WBC  9.3  


 


RBC  5.35  


 


Hgb  17.1  


 


Hct  50.0  


 


MCV  93.5  


 


MCH  32.0 H  


 


MCHC  34.2  


 


RDW  13.2  


 


Plt Count  225  


 


MPV  8.3  


 


Neut % (Auto)  79.8 H  


 


Lymph % (Auto)  8.4 L  


 


Mono % (Auto)  6.6  


 


Eos % (Auto)  4.7 H  


 


Baso % (Auto)  0.5  


 


Neut #  7.4 H  


 


Lymph #  0.8 L  


 


Mono #  0.6  


 


Eos #  0.4  


 


Baso #  0.1  


 


Neutrophils % (Manual)  85 H  


 


Lymphocytes % (Manual)  9 L  


 


Monocytes % (Manual)  4  


 


Eosinophils % (Manual)  2  


 


Platelet Estimate  Normal  


 


RBC Morphology  Normal  


 


PT   


 


INR   


 


APTT   


 


D-Dimer, Quantitative   2736 H 


 


Sodium    136


 


Potassium    4.2


 


Chloride    96 L


 


Carbon Dioxide    22


 


Anion Gap    23 H


 


BUN    24 H


 


Creatinine    1.7 H


 


Est GFR ( Amer)    49


 


Est GFR (Non-Af Amer)    41


 


Random Glucose    139 H


 


Calcium    8.9


 


Magnesium   


 


Total Bilirubin    1.0


 


AST    56


 


ALT    71


 


Alkaline Phosphatase    69


 


CK-MB (Mass)    3.30


 


Troponin I    0.2300 H*


 


NT-Pro-B Natriuret Pep    4700 H


 


Total Protein    7.7


 


Albumin    4.0


 


Globulin    3.7


 


Albumin/Globulin Ratio    1.1


 


Lipase    44


 


Urine Color   


 


Urine Clarity   


 


Urine pH   


 


Ur Specific Gravity   


 


Urine Protein   


 


Urine Glucose (UA)   


 


Urine Ketones   


 


Urine Blood   


 


Urine Nitrate   


 


Urine Bilirubin   


 


Urine Urobilinogen   


 


Ur Leukocyte Esterase   


 


Urine WBC (Auto)   


 


Urine RBC (Auto)   


 


Urine Bacteria   


 


Hyaline Casts   














  11/22/17 11/22/17 11/22/17





  16:05 17:20 17:51


 


WBC   


 


RBC   


 


Hgb   


 


Hct   


 


MCV   


 


MCH   


 


MCHC   


 


RDW   


 


Plt Count   


 


MPV   


 


Neut % (Auto)   


 


Lymph % (Auto)   


 


Mono % (Auto)   


 


Eos % (Auto)   


 


Baso % (Auto)   


 


Neut #   


 


Lymph #   


 


Mono #   


 


Eos #   


 


Baso #   


 


Neutrophils % (Manual)   


 


Lymphocytes % (Manual)   


 


Monocytes % (Manual)   


 


Eosinophils % (Manual)   


 


Platelet Estimate   


 


RBC Morphology   


 


PT  13.0 H  


 


INR  1.2  


 


APTT    32


 


D-Dimer, Quantitative   


 


Sodium   


 


Potassium   


 


Chloride   


 


Carbon Dioxide   


 


Anion Gap   


 


BUN   


 


Creatinine   


 


Est GFR ( Amer)   


 


Est GFR (Non-Af Amer)   


 


Random Glucose   


 


Calcium   


 


Magnesium   


 


Total Bilirubin   


 


AST   


 


ALT   


 


Alkaline Phosphatase   


 


CK-MB (Mass)   


 


Troponin I   


 


NT-Pro-B Natriuret Pep   


 


Total Protein   


 


Albumin   


 


Globulin   


 


Albumin/Globulin Ratio   


 


Lipase   


 


Urine Color   Straw 


 


Urine Clarity   Clear 


 


Urine pH   5.0 


 


Ur Specific Gravity   1.006 


 


Urine Protein   Negative 


 


Urine Glucose (UA)   Normal 


 


Urine Ketones   Negative 


 


Urine Blood   Negative 


 


Urine Nitrate   Negative 


 


Urine Bilirubin   Negative 


 


Urine Urobilinogen   Normal 


 


Ur Leukocyte Esterase   Neg 


 


Urine WBC (Auto)   < 1 


 


Urine RBC (Auto)   < 1 


 


Urine Bacteria   Rare 


 


Hyaline Casts   6-10 H 














  11/23/17 11/23/17 11/23/17





  00:05 06:16 06:16


 


WBC   6.6 


 


RBC   4.91 


 


Hgb   15.6 


 


Hct   46.5 


 


MCV   94.6 H 


 


MCH   31.7 H 


 


MCHC   33.5 


 


RDW   13.1 


 


Plt Count   190 


 


MPV   8.7 


 


Neut % (Auto)   67.7 


 


Lymph % (Auto)   16.6 L 


 


Mono % (Auto)   8.6 


 


Eos % (Auto)   6.5 H 


 


Baso % (Auto)   0.6 


 


Neut #   4.5 


 


Lymph #   1.1 


 


Mono #   0.6 


 


Eos #   0.4 


 


Baso #   0.0 


 


Neutrophils % (Manual)   


 


Lymphocytes % (Manual)   


 


Monocytes % (Manual)   


 


Eosinophils % (Manual)   


 


Platelet Estimate   


 


RBC Morphology   


 


PT   


 


INR   


 


APTT  164 H* D  


 


D-Dimer, Quantitative   


 


Sodium    135


 


Potassium    4.0


 


Chloride    101


 


Carbon Dioxide    25


 


Anion Gap    13


 


BUN    23 H


 


Creatinine    1.4


 


Est GFR ( Amer)    > 60


 


Est GFR (Non-Af Amer)    51


 


Random Glucose    87


 


Calcium    8.2 L


 


Magnesium    1.1 L


 


Total Bilirubin    1.1


 


AST    33


 


ALT    50


 


Alkaline Phosphatase    56


 


CK-MB (Mass)   


 


Troponin I    0.1370 H*


 


NT-Pro-B Natriuret Pep   


 


Total Protein    5.8 L


 


Albumin    3.4 L


 


Globulin    2.4


 


Albumin/Globulin Ratio    1.4


 


Lipase   


 


Urine Color   


 


Urine Clarity   


 


Urine pH   


 


Ur Specific Gravity   


 


Urine Protein   


 


Urine Glucose (UA)   


 


Urine Ketones   


 


Urine Blood   


 


Urine Nitrate   


 


Urine Bilirubin   


 


Urine Urobilinogen   


 


Ur Leukocyte Esterase   


 


Urine WBC (Auto)   


 


Urine RBC (Auto)   


 


Urine Bacteria   


 


Hyaline Casts   











EKG/Cardiology Studies: 


Cardiology / EKG Studies





11/22/17 15:25


ELECTROCARDIOGRAM Stat 


   Comment: 


   Mode Of Transportation: BED


   Reason For Exam: Pain














Critical Care Progress Note





- Nutrition


Nutrition: 


 Nutrition











 Category Date Time Status


 


 Heart Healthy Diet [DIET] Diets  11/22/17 Dinner Active


 


 NPO Diet [DIET] Diets  11/23/17 Lunch Active














Assessment/Plan





- Assessment and Plan (Free Text)


Plan: 


Patient with PE: will convert from IV heparin to oral eliquis 10 mg q12hrs


-monitor for bleeding


-dopplers LE pending


-signs of pulmonary HTN


-heme/onc follow up


-

## 2017-11-23 NOTE — CP.PCM.CON
History of Present Illness





- History of Present Illness


History of Present Illness: 





Patient with Submassive MN


Reluctant for EKOS lytic therapy


Continue IV Heparin


Will check ECHO and US legs





If RV strain detected on ECHO will reinitiate discussions about EKOS


Continue current meds





Past Patient History





- Past Medical History & Family History


Past Medical History?: Yes





- Past Social History


Smoking Status: Never Smoked


Alcohol: > 2 Drinks/Day


Home Situation {Lives}: Alone


Domestic Violence: Negative





- CARDIAC


Hx Hypertension: Yes





- PULMONARY


Hx Respiratory Disorders: No





- NEUROLOGICAL


Hx Neurological Disorder: No





- HEENT


Hx HEENT Problems: No





- RENAL


Hx Chronic Kidney Disease: No





- ENDOCRINE/METABOLIC


Hx Endocrine Disorders: No





- HEMATOLOGICAL/ONCOLOGICAL


Hx Blood Disorders: No





- INTEGUMENTARY


Hx Dermatological Problems: No





- MUSCULOSKELETAL/RHEUMATOLOGICAL


Hx Musculoskeletal Disorders: No





- GASTROINTESTINAL


Hx Gastrointestinal Disorders: No





- GENITOURINARY/GYNECOLOGICAL


Hx Genitourinary Disorders: No





- PSYCHIATRIC


Hx Substance Use: No





- SURGICAL HISTORY


Hx Surgeries: Yes


Other/Comment: as per patient, "years ago when I had pneumonia. A surgery in my 

lungs, I'm not sure what"





- ANESTHESIA


Hx Anesthesia: Yes


Hx Anesthesia Reactions: No





Meds


Allergies/Adverse Reactions: 


 Allergies











Allergy/AdvReac Type Severity Reaction Status Date / Time


 


No Known Allergies Allergy   Verified 11/22/17 14:52














- Medications


Medications: 


 Current Medications





Acetaminophen (Tylenol 325mg Tab)  650 mg PO Q6 PRN


   PRN Reason: Pain, moderate (4-7)


   Last Admin: 11/23/17 22:30 Dose:  650 mg


Chlordiazepoxide (Librium)  25 mg PO Q8 [facility]


   Last Admin: 11/23/17 22:25 Dose:  25 mg


Folic Acid (Folic Acid)  1 mg PO DAILY [facility]


   Last Admin: 11/23/17 09:08 Dose:  1 mg


Heparin Sodium/Sodium Chloride (Heparin 00783 Units/250ml 1/2 Normal Saline)  25

,000 units in 250 mls @ 17.316 mls/hr IV .[id number] PRN; Protocol; 18 UNITS/KG/HR


   PRN Reason: PROTOCOL


   Last Admin: 11/23/17 10:39 Dose:  15 units/kg/hr, 14.43 mls/hr


Sodium Chloride (Sodium Chloride 0.9%)  1,000 mls @ 75 mls/hr IV .[id number] [facility]


   Last Admin: 11/23/17 22:35 Dose:  75 mls/hr


Multivitamins (Hexavitamin)  1 tab PO DAILY [facility]


   Last Admin: 11/23/17 09:08 Dose:  1 tab


Pantoprazole Sodium (Protonix Ec Tab)  40 mg PO DAILY [facility]


   Last Admin: 11/23/17 09:08 Dose:  40 mg


Thiamine HCl (Vitamin B1 Tab)  100 mg PO DAILY [facility]


   Last Admin: 11/23/17 09:11 Dose:  100 mg











Results





- Vital Signs


Recent Vital Signs: 


 Last Vital Signs











Temp  97.9 F   11/23/17 22:30


 


Pulse  101 H  11/23/17 23:00


 


Resp  26 H  11/23/17 23:00


 


BP  121/97 H  11/23/17 23:00


 


Pulse Ox  97   11/23/17 23:00














- Labs


Result Diagrams: 


 11/23/17 06:16





 11/23/17 06:16


Labs: 


 Laboratory Results - last 24 hr











  11/23/17 11/23/17 11/23/17





  00:05 06:16 06:16


 


WBC   6.6 


 


RBC   4.91 


 


Hgb   15.6 


 


Hct   46.5 


 


MCV   94.6 H 


 


MCH   31.7 H 


 


MCHC   33.5 


 


RDW   13.1 


 


Plt Count   190 


 


MPV   8.7 


 


Neut % (Auto)   67.7 


 


Lymph % (Auto)   16.6 L 


 


Mono % (Auto)   8.6 


 


Eos % (Auto)   6.5 H 


 


Baso % (Auto)   0.6 


 


Neut #   4.5 


 


Lymph #   1.1 


 


Mono #   0.6 


 


Eos #   0.4 


 


Baso #   0.0 


 


APTT  164 H* D  


 


Sodium    135


 


Potassium    4.0


 


Chloride    101


 


Carbon Dioxide    25


 


Anion Gap    13


 


BUN    23 H


 


Creatinine    1.4


 


Est GFR ( Amer)    > 60


 


Est GFR (Non-Af Amer)    51


 


Random Glucose    87


 


Calcium    8.2 L


 


Magnesium    1.1 L


 


Total Bilirubin    1.1


 


AST    33


 


ALT    50


 


Alkaline Phosphatase    56


 


Troponin I    0.1370 H*


 


Total Protein    5.8 L


 


Albumin    3.4 L


 


Globulin    2.4


 


Albumin/Globulin Ratio    1.4














  11/23/17 11/23/17





  08:23 14:12


 


WBC  


 


RBC  


 


Hgb  


 


Hct  


 


MCV  


 


MCH  


 


MCHC  


 


RDW  


 


Plt Count  


 


MPV  


 


Neut % (Auto)  


 


Lymph % (Auto)  


 


Mono % (Auto)  


 


Eos % (Auto)  


 


Baso % (Auto)  


 


Neut #  


 


Lymph #  


 


Mono #  


 


Eos #  


 


Baso #  


 


APTT  71 H D  66 H D


 


Sodium  


 


Potassium  


 


Chloride  


 


Carbon Dioxide  


 


Anion Gap  


 


BUN  


 


Creatinine  


 


Est GFR ( Amer)  


 


Est GFR (Non-Af Amer)  


 


Random Glucose  


 


Calcium  


 


Magnesium  


 


Total Bilirubin  


 


AST  


 


ALT  


 


Alkaline Phosphatase  


 


Troponin I  


 


Total Protein  


 


Albumin  


 


Globulin  


 


Albumin/Globulin Ratio

## 2017-11-24 LAB
ALBUMIN/GLOB SERPL: 1 {RATIO} (ref 1–2.1)
ALP SERPL-CCNC: 57 U/L (ref 38–126)
ALT SERPL-CCNC: 54 U/L (ref 21–72)
AST SERPL-CCNC: 19 U/L (ref 17–59)
BASOPHILS # BLD AUTO: 0 K/UL (ref 0–0.2)
BASOPHILS NFR BLD: 0.7 % (ref 0–2)
BILIRUB SERPL-MCNC: 0.3 MG/DL (ref 0.2–1.3)
BUN SERPL-MCNC: 24 MG/DL (ref 9–20)
CALCIUM SERPL-MCNC: 8 MG/DL (ref 8.6–10.4)
CHLORIDE SERPL-SCNC: 104 MMOL/L (ref 98–107)
CHOLEST SERPL-MCNC: 115 MG/DL (ref 0–199)
CO2 SERPL-SCNC: 25 MMOL/L (ref 22–30)
EOSINOPHIL # BLD AUTO: 0.5 K/UL (ref 0–0.7)
EOSINOPHIL NFR BLD: 8.2 % (ref 0–4)
ERYTHROCYTE [DISTWIDTH] IN BLOOD BY AUTOMATED COUNT: 13.5 % (ref 11.5–14.5)
GLOBULIN SER-MCNC: 3.3 GM/DL (ref 2.2–3.9)
GLUCOSE SERPL-MCNC: 105 MG/DL (ref 75–110)
HCT VFR BLD CALC: 44 % (ref 35–51)
LYMPHOCYTES # BLD AUTO: 1 K/UL (ref 1–4.3)
LYMPHOCYTES NFR BLD AUTO: 17 % (ref 20–40)
MAGNESIUM SERPL-MCNC: 1.3 MG/DL (ref 1.6–2.3)
MCH RBC QN AUTO: 32.2 PG (ref 27–31)
MCHC RBC AUTO-ENTMCNC: 34.1 G/DL (ref 33–37)
MCV RBC AUTO: 94.4 FL (ref 80–94)
MONOCYTES # BLD: 0.5 K/UL (ref 0–0.8)
MONOCYTES NFR BLD: 9 % (ref 0–10)
NRBC BLD AUTO-RTO: 0.1 % (ref 0–2)
PHOSPHATE SERPL-MCNC: 4.1 MG/DL (ref 2.5–4.5)
PLATELET # BLD: 187 K/UL (ref 130–400)
PMV BLD AUTO: 8.6 FL (ref 7.2–11.7)
POTASSIUM SERPL-SCNC: 3.9 MMOL/L (ref 3.6–5.2)
PROT SERPL-MCNC: 6.5 G/DL (ref 6.3–8.3)
SODIUM SERPL-SCNC: 137 MMOL/L (ref 132–148)
TROPONIN I SERPL-MCNC: 0.11 NG/ML (ref 0–0.12)
WBC # BLD AUTO: 5.7 K/UL (ref 4.8–10.8)

## 2017-11-24 RX ADMIN — PANTOPRAZOLE SODIUM SCH MG: 40 TABLET, DELAYED RELEASE ORAL at 09:14

## 2017-11-24 RX ADMIN — Medication SCH TAB: at 09:13

## 2017-11-24 RX ADMIN — HEPARIN SODIUM PRN MLS/HR: 10000 INJECTION, SOLUTION INTRAVENOUS at 06:25

## 2017-11-24 NOTE — CP.[email or web address].PN
Subjective





- Date & Time of Evaluation


Date of Evaluation: 11/24/17


Time of Evaluation: 10:30





- Subjective


Subjective: 





Patient seen and evaluated


Feels improvement in breathing


Intermittent right sided chest pain


Both ECHO and Venous duplex reviewed





Patient advised EKOS lytic therapy due to right heart strain


Patient refuses any kind if invasive procedures


Change over to Eliquis








Objective





- Vital Signs/Intake and Output


Vital Signs (last 24 hours): 


 











Temp Pulse Resp BP Pulse Ox


 


 98.3 F   90   13   127/103 H  93 L


 


 11/24/17 16:00  11/24/17 18:00  11/24/17 17:01  11/24/17 17:01  11/24/17 16:00








Intake and Output: 


 











 11/24/17 11/25/17





 18:59 06:59


 


Intake Total 1735.9 


 


Output Total 1000 


 


Balance 735.9 














- Medications


Medications: 


 Current Medications





Acetaminophen (Tylenol 325mg Tab)  650 mg PO Q6 PRN


   PRN Reason: Pain, moderate (4-7)


   Last Admin: 11/23/17 22:30 Dose:  650 mg


Apixaban (Eliquis)  10 mg PO BID [facility]


   Stop: 11/30/17 18:01


   Last Admin: 11/24/17 18:16 Dose:  10 mg


Apixaban (Eliquis)  5 mg PO BID [facility]


Chlordiazepoxide (Librium)  25 mg PO Q8 [facility]


   Last Admin: 11/24/17 14:30 Dose:  25 mg


Folic Acid (Folic Acid)  1 mg PO DAILY [facility]


   Last Admin: 11/24/17 09:13 Dose:  1 mg


Sodium Chloride (Sodium Chloride 0.9%)  1,000 mls @ 75 mls/hr IV .[id number] [facility]


   Last Admin: 11/24/17 14:30 Dose:  75 mls/hr


Multivitamins (Hexavitamin)  1 tab PO DAILY [facility]


   Last Admin: 11/24/17 09:13 Dose:  1 tab


Pantoprazole Sodium (Protonix Ec Tab)  40 mg PO DAILY [facility]


   Last Admin: 11/24/17 09:14 Dose:  40 mg


Thiamine HCl (Vitamin B1 Tab)  100 mg PO DAILY [facility]


   Last Admin: 11/24/17 09:14 Dose:  100 mg











- Labs


Labs: 


 





 11/24/17 06:36 





 11/24/17 06:36 





 











PT  13.0 SECONDS (9.7-12.2)  H  11/22/17  16:05    


 


INR  1.2   11/22/17  16:05    


 


APTT  158 SECONDS (21-34)  H* D 11/24/17  06:36

## 2017-11-24 NOTE — VASCLAB
PROCEDURE:  Lower Extremity Venous Duplex Exam.



HISTORY:

b/l proximal pe 



PRIORS:

None. 



TECHNIQUE:

Bilateral common femoral, femoral, popliteal and posterior tibial, 

peroneal and great saphenous veins were evaluated. Flow was assessed 

with color Doppler, compressibility, assessment of phasic flow and 

augmentation response.



Report prepared by   Rodolfo Little, BRANDY, RVT



FINDINGS:



RIGHT:

1. Common Femoral Vein: 



1.1. Compressibility - Fully compressible: Thrombus -  None : Flow - 

Phasic: Augmentation -Normal: Reflux - None.



2. Femoral Vein:



2.1. Compressibility - Fully compressible: Thrombus -  None : Flow - 

Phasic: Augmentation -Normal: Reflux - None.



3. Popliteal Vein: 



3.1. Compressibility - Partial: Thrombus - Acute :  Flow - Absent : 

Augmentation -None: Reflux - None.



4. Posterior Tibial Vein: 



4.1. Compressibility - Fully compressible: Thrombus -  None: Flow - 

Phasic: Augmentation -Normal: Reflux - None.



5. Peroneal Vein:



5.1. Compressibility - Partial: Thrombus -  Acute: Flow - Absent : 

Augmentation -None: Reflux - None.



6. Great Saphenous Vein:

6.1. Compressibility - Fully compressible: Thrombus - None: Flow - 

Phasic: Augmentation - Normal: Reflux - None.





LEFT:

1. Common Femoral Vein:



1.1.  Compressibility - Fully compressible: Thrombus -  None: Flow - 

Phasic: Augmentation -Normal: Reflux - None.



2. Femoral Vein:



2.1.  Compressibility - Fully compressible: Thrombus -  None: Flow - 

Phasic: Augmentation -Normal: Reflux - None.



3. Popliteal Vein:



3.1.  Compressibility - Fully compressible: Thrombus -  None : Flow - 

Phasic: Augmentation -Normal: Reflux - None.



4. Posterior Tibial Vein:



4.1.  Compressibility - Fully compressible: Thrombus -  None: Flow - 

Phasic: Augmentation -Normal: Reflux - None.



5. Peroneal Vein:



5.1.  Compressibility - Fully compressible: Thrombus -  None: Flow - 

Phasic: Augmentation -Normal: Reflux - None.



6. Great Saphenous Vein:

6.1.  Compressibility - Fully compressible: Thrombus -  None: Flow - 

Phasic: Augmentation - Normal: Reflux - None.





OTHER FINDINGS:   Dr. Teague notified about the findings. 



IMPRESSION:

Right: 



Acute thrombosis of the right popliteal and peroneal veins with 

severe reduction of the venous return.     



Left: 



No evidence of deep or superficial vein thrombosis of the left lower 

extremity. Normal valve function noted of the left side.

## 2017-11-24 NOTE — CP.PCM.PN
Subjective





- Date & Time of Evaluation


Date of Evaluation: 11/24/17


Time of Evaluation: 10:39





- Subjective


Subjective: 


PGY2 Medicine Note for Dr. Nolen; all management as per Dr. Nolen 





This patient was seen and examined at bedside with attending physician; the 

patient has no complaints today and states that his SOB has greatly improved 

since admission and even then was only on exertion; patient is to have PT 

today. The patient denies any fevers/chills, HA, CP, SOB, abdominal pain, N/V/D

, dysuria/freq/urg or lower extremity pain/swelling. 





Objective





- Vital Signs/Intake and Output


Vital Signs (last 24 hours): 


 











Temp Pulse Resp BP Pulse Ox


 


 98 F   87   15   140/107 H  98 


 


 11/24/17 04:00  11/24/17 08:12  11/24/17 07:00  11/24/17 08:12  11/24/17 08:12








Intake and Output: 


 











 11/24/17 11/24/17





 06:59 18:59


 


Intake Total 2162.8 99.4


 


Output Total 1300 


 


Balance 862.8 99.4














- Medications


Medications: 


 Current Medications





Acetaminophen (Tylenol 325mg Tab)  650 mg PO Q6 PRN


   PRN Reason: Pain, moderate (4-7)


   Last Admin: 11/23/17 22:30 Dose:  650 mg


Apixaban (Eliquis)  10 mg PO BID [facility]


   Stop: 11/30/17 18:01


   Last Admin: 11/24/17 09:55 Dose:  10 mg


Apixaban (Eliquis)  5 mg PO BID [facility]


Chlordiazepoxide (Librium)  25 mg PO Q8 [facility]


   Last Admin: 11/24/17 06:20 Dose:  25 mg


Folic Acid (Folic Acid)  1 mg PO DAILY [facility]


   Last Admin: 11/24/17 09:13 Dose:  1 mg


Sodium Chloride (Sodium Chloride 0.9%)  1,000 mls @ 75 mls/hr IV .[id number] [facility]


   Last Admin: 11/23/17 22:35 Dose:  75 mls/hr


Multivitamins (Hexavitamin)  1 tab PO DAILY [facility]


   Last Admin: 11/24/17 09:13 Dose:  1 tab


Pantoprazole Sodium (Protonix Ec Tab)  40 mg PO DAILY [facility]


   Last Admin: 11/24/17 09:14 Dose:  40 mg


Thiamine HCl (Vitamin B1 Tab)  100 mg PO DAILY [facility]


   Last Admin: 11/24/17 09:14 Dose:  100 mg











- Labs


Labs: 


 





 11/24/17 06:36 





 11/24/17 06:36 





 











PT  13.0 SECONDS (9.7-12.2)  H  11/22/17  16:05    


 


INR  1.2   11/22/17  16:05    


 


APTT  158 SECONDS (21-34)  H* D 11/24/17  06:36    














- Constitutional


Appears: Well, Non-toxic





- Head Exam


Head Exam: ATRAUMATIC





- Eye Exam


Eye Exam: EOMI


Pupil Exam: PERRL





- ENT Exam


ENT Exam: Mucous Membranes Moist





- Neck Exam


Neck Exam: Full ROM





- Respiratory Exam


Respiratory Exam: Clear to Ausculation Bilateral, NORMAL BREATHING PATTERN.  

absent: Rales, Rhonchi, Wheezes





- Cardiovascular Exam


Cardiovascular Exam: REGULAR RHYTHM, +S1, +S2





- GI/Abdominal Exam


GI & Abdominal Exam: Soft, Normal Bowel Sounds





- Rectal Exam


Rectal Exam: Deferred





- Extremities Exam


Extremities Exam: Full ROM.  absent: Calf Tenderness





- Back Exam


Back Exam: NORMAL INSPECTION.  absent: CVA tenderness (L), CVA tenderness (R)





- Neurological Exam


Neurological Exam: Alert, Awake, Oriented x3





- Psychiatric Exam


Psychiatric exam: Normal Affect, Normal Mood





Assessment and Plan





- Assessment and Plan (Free Text)


Assessment: 


63yo M admitted for submassive PE





Submassive PE; improving


-patient will be started on Eliquis 10mg BID today; will need for one week and 

then 5mg BID 


-patient will need CT w/ contrast at some point in the future as per 

pulmonology for PE monitoring


-patient will need hypercoagable workup when off NOAC


-troponin leak most likely 2/2 to RV strain from PE


-telemetry monitoring





Elevated BP


-will started patient on losartan 50mg





Elevated blood sugars


-HbA1C pending; has family history





EtOH abuse


[place] protocol


Librium Taper


Folic Acid/Thiamine daily 





Proph


Eliquis


Protonix


PT Out of bed as tolerated





If patient is cleared by PT he can go home tomorrow and f/u with Dr. Nolen 

within the week

## 2017-11-24 NOTE — HP
HISTORY OF PRESENT ILLNESS:  A 62-year-old male admitted to the hospital

with complaint of shortness of breath on exertion _____ increasing in

severity, the present condition unable to walk.  The patient came to the ER

and found to have massive PE.  The patient was advised admission to the

hospital.  The patient is a nonsmoker, history of hypertension, working at

food business.



PHYSICAL EXAMINATION:

GENERAL:  The patient is awake, alert, oriented.

VITAL SIGNS:  Temperature is 98, pulse is 95, blood pressure is 120/70.

HEENT:  Within normal limits.

NECK:  Supple.

CHEST:  Symmetrical.

HEART:  Irregular, tachycardic.

ABDOMEN:  Soft.

EXTREMITIES:  No edema.



The patient suffered from bilateral PE.  The patient is on IV heparin. 

Cardiology consult for possible TPA.  ICU monitoring.





__________________________________________

Alpa Nolen MD





DD:  11/23/2017 12:33:05

DT:  11/23/2017 16:48:36

Job # [id number]

## 2017-11-25 RX ADMIN — Medication SCH TAB: at 09:50

## 2017-11-25 RX ADMIN — PANTOPRAZOLE SODIUM SCH MG: 40 TABLET, DELAYED RELEASE ORAL at 09:50

## 2017-11-25 NOTE — CP.PCM.PN
Subjective





- Date & Time of Evaluation


Date of Evaluation: 11/25/17


Time of Evaluation: 10:30





- Subjective


Subjective: 





Patient seen and evaluated


Hemodynamically stable


On Eliquis for PE





Objective





- Vital Signs/Intake and Output


Vital Signs (last 24 hours): 


 











Temp Pulse Resp BP Pulse Ox


 


 97.8 F   96 H  21   153/103 H  96 


 


 11/25/17 20:00  11/25/17 20:00  11/25/17 20:00  11/25/17 20:00  11/25/17 20:00








Intake and Output: 


 











 11/25/17 11/26/17





 18:59 06:59


 


Intake Total 1100 


 


Output Total 1050 


 


Balance 50 














- Medications


Medications: 


 Current Medications





Acetaminophen (Tylenol 325mg Tab)  650 mg PO Q6 PRN


   PRN Reason: Pain, moderate (4-7)


   Last Admin: 11/23/17 22:30 Dose:  650 mg


Amlodipine Besylate (Norvasc)  5 mg PO DAILY [facility]


   Last Admin: 11/25/17 18:04 Dose:  5 mg


Apixaban (Eliquis)  10 mg PO BID [facility]


   Stop: 11/30/17 18:01


   Last Admin: 11/25/17 18:04 Dose:  10 mg


Apixaban (Eliquis)  5 mg PO BID [facility]


Chlordiazepoxide (Librium)  25 mg PO Q8 [facility]


   Last Admin: 11/25/17 21:29 Dose:  25 mg


Folic Acid (Folic Acid)  1 mg PO DAILY [facility]


   Last Admin: 11/25/17 09:49 Dose:  1 mg


Sodium Chloride (Sodium Chloride 0.9%)  1,000 mls @ 75 mls/hr IV .[id number] [facility]


   Stop: 11/25/17 23:59


   Last Admin: 11/25/17 14:51 Dose:  75 mls/hr


Lisinopril (Zestril)  20 mg PO DAILY [facility]


Multivitamins (Hexavitamin)  1 tab PO DAILY [facility]


   Last Admin: 11/25/17 09:50 Dose:  1 tab


Pantoprazole Sodium (Protonix Ec Tab)  40 mg PO DAILY [facility]


   Last Admin: 11/25/17 09:50 Dose:  40 mg


Thiamine HCl (Vitamin B1 Tab)  100 mg PO DAILY [facility]


   Last Admin: 11/25/17 09:50 Dose:  100 mg











- Labs


Labs: 


 





 11/24/17 06:36 





 11/24/17 06:36 





 











PT  13.0 SECONDS (9.7-12.2)  H  11/22/17  16:05    


 


INR  1.2   11/22/17  16:05    


 


APTT  158 SECONDS (21-34)  H* D 11/24/17  06:36

## 2017-11-26 RX ADMIN — PROMETHAZINE HYDROCHLORIDE AND CODEINE PHOSPHATE PRN ML: 6.25; 1 SOLUTION ORAL at 14:30

## 2017-11-26 RX ADMIN — PROMETHAZINE HYDROCHLORIDE AND CODEINE PHOSPHATE PRN ML: 6.25; 1 SOLUTION ORAL at 22:00

## 2017-11-26 RX ADMIN — Medication SCH TAB: at 09:23

## 2017-11-26 RX ADMIN — IPRATROPIUM BROMIDE AND ALBUTEROL SULFATE SCH ML: .5; 3 SOLUTION RESPIRATORY (INHALATION) at 20:58

## 2017-11-26 RX ADMIN — PANTOPRAZOLE SODIUM SCH MG: 40 TABLET, DELAYED RELEASE ORAL at 09:24

## 2017-11-26 NOTE — CARD
--------------- APPROVED REPORT --------------





EKG Measurement

Heart [id number]

NV [id number]

[id number]

[id number]

[id number]



<Conclusion>

Sinus tachycardia

Inferior infarct, age undetermined

T wave abnormality, consider anterolateral ischemia

Abnormal ECG

## 2017-11-26 NOTE — CP.PCM.PN
Subjective





- Date & Time of Evaluation


Date of Evaluation: 11/26/17


Time of Evaluation: 18:00





- Subjective


Subjective: 





Patient seen and evaluated


Still has some dyspnea and cough


On anticoagulation for Pulmonary embolism


Awaiting hematology consult





Objective





- Vital Signs/Intake and Output


Vital Signs (last 24 hours): 


 











Temp Pulse Resp BP Pulse Ox


 


 98.7 F   97 H  15   136/111 H  96 


 


 11/26/17 16:00  11/26/17 16:00  11/26/17 16:00  11/26/17 16:00  11/26/17 12:00








Intake and Output: 


 











 11/26/17 11/27/17





 18:59 06:59


 


Intake Total 300 


 


Output Total 401 


 


Balance -101 














- Medications


Medications: 


 Current Medications





Acetaminophen (Tylenol 325mg Tab)  650 mg PO Q6 PRN


   PRN Reason: Pain, moderate (4-7)


   Last Admin: 11/23/17 22:30 Dose:  650 mg


Albuterol/Ipratropium (Duoneb 3 Mg/0.5 Mg (3 Ml) Ud)  3 ml INH RQ6 [facility]


Amlodipine Besylate (Norvasc)  5 mg PO DAILY [facility]


   Last Admin: 11/26/17 09:24 Dose:  5 mg


Apixaban (Eliquis)  10 mg PO BID [facility]


   Stop: 11/30/17 18:01


   Last Admin: 11/26/17 09:23 Dose:  10 mg


Apixaban (Eliquis)  5 mg PO BID [facility]


Chlordiazepoxide (Librium)  25 mg PO Q8 [facility]


   Last Admin: 11/26/17 15:00 Dose:  25 mg


Folic Acid (Folic Acid)  1 mg PO DAILY [facility]


   Last Admin: 11/26/17 09:23 Dose:  1 mg


Lisinopril (Zestril)  20 mg PO DAILY [facility]


   Last Admin: 11/26/17 09:27 Dose:  20 mg


Multivitamins (Hexavitamin)  1 tab PO DAILY [facility]


   Last Admin: 11/26/17 09:23 Dose:  1 tab


Pantoprazole Sodium (Protonix Ec Tab)  40 mg PO DAILY [facility]


   Last Admin: 11/26/17 09:24 Dose:  40 mg


Promethazine HCl/Codeine (Phenergan/Codeine Oral Syrup)  5 ml PO Q6H PRN


   PRN Reason: cough


   Last Admin: 11/26/17 14:30 Dose:  5 ml


Thiamine HCl (Vitamin B1 Tab)  100 mg PO DAILY [facility]


   Last Admin: 11/26/17 09:25 Dose:  100 mg











- Labs


Labs: 


 





 11/24/17 06:36 





 11/24/17 06:36 





 











PT  13.0 SECONDS (9.7-12.2)  H  11/22/17  16:05    


 


INR  1.2   11/22/17  16:05    


 


APTT  158 SECONDS (21-34)  H* D 11/24/17  06:36

## 2017-11-27 LAB
BUN SERPL-MCNC: 21 MG/DL (ref 9–20)
CALCIUM SERPL-MCNC: 8.8 MG/DL (ref 8.6–10.4)
CHLORIDE SERPL-SCNC: 102 MMOL/L (ref 98–107)
CO2 SERPL-SCNC: 26 MMOL/L (ref 22–30)
ERYTHROCYTE [DISTWIDTH] IN BLOOD BY AUTOMATED COUNT: 13.4 % (ref 11.5–14.5)
GLUCOSE SERPL-MCNC: 93 MG/DL (ref 75–110)
HCT VFR BLD CALC: 42.3 % (ref 35–51)
MAGNESIUM SERPL-MCNC: 1.2 MG/DL (ref 1.6–2.3)
MCH RBC QN AUTO: 32.9 PG (ref 27–31)
MCHC RBC AUTO-ENTMCNC: 34.7 G/DL (ref 33–37)
MCV RBC AUTO: 94.9 FL (ref 80–94)
PLATELET # BLD: 216 K/UL (ref 130–400)
PMV BLD AUTO: 8.8 FL (ref 7.2–11.7)
POTASSIUM SERPL-SCNC: 3.8 MMOL/L (ref 3.6–5.2)
SODIUM SERPL-SCNC: 138 MMOL/L (ref 132–148)
WBC # BLD AUTO: 5.6 K/UL (ref 4.8–10.8)

## 2017-11-27 RX ADMIN — METHYLPREDNISOLONE SODIUM SUCCINATE SCH MG: 40 INJECTION, POWDER, FOR SOLUTION INTRAMUSCULAR; INTRAVENOUS at 21:54

## 2017-11-27 RX ADMIN — PROMETHAZINE HYDROCHLORIDE AND CODEINE PHOSPHATE PRN ML: 6.25; 1 SOLUTION ORAL at 21:55

## 2017-11-27 RX ADMIN — IPRATROPIUM BROMIDE AND ALBUTEROL SULFATE SCH ML: .5; 3 SOLUTION RESPIRATORY (INHALATION) at 03:06

## 2017-11-27 RX ADMIN — IPRATROPIUM BROMIDE AND ALBUTEROL SULFATE SCH ML: .5; 3 SOLUTION RESPIRATORY (INHALATION) at 07:43

## 2017-11-27 RX ADMIN — IPRATROPIUM BROMIDE AND ALBUTEROL SULFATE SCH: .5; 3 SOLUTION RESPIRATORY (INHALATION) at 02:52

## 2017-11-27 RX ADMIN — MAGNESIUM SULFATE IN DEXTROSE SCH MLS/HR: 10 INJECTION, SOLUTION INTRAVENOUS at 20:56

## 2017-11-27 RX ADMIN — PROMETHAZINE HYDROCHLORIDE AND CODEINE PHOSPHATE PRN ML: 6.25; 1 SOLUTION ORAL at 05:25

## 2017-11-27 RX ADMIN — Medication SCH TAB: at 09:36

## 2017-11-27 RX ADMIN — METHYLPREDNISOLONE SODIUM SUCCINATE SCH MG: 40 INJECTION, POWDER, FOR SOLUTION INTRAMUSCULAR; INTRAVENOUS at 13:55

## 2017-11-27 RX ADMIN — IPRATROPIUM BROMIDE AND ALBUTEROL SULFATE SCH ML: .5; 3 SOLUTION RESPIRATORY (INHALATION) at 20:16

## 2017-11-27 RX ADMIN — MAGNESIUM SULFATE IN DEXTROSE SCH MLS/HR: 10 INJECTION, SOLUTION INTRAVENOUS at 19:54

## 2017-11-27 RX ADMIN — IPRATROPIUM BROMIDE AND ALBUTEROL SULFATE SCH: .5; 3 SOLUTION RESPIRATORY (INHALATION) at 14:33

## 2017-11-27 RX ADMIN — PANTOPRAZOLE SODIUM SCH MG: 40 TABLET, DELAYED RELEASE ORAL at 09:36

## 2017-11-27 RX ADMIN — PROMETHAZINE HYDROCHLORIDE AND CODEINE PHOSPHATE PRN ML: 6.25; 1 SOLUTION ORAL at 11:56

## 2017-11-27 NOTE — RAD
HISTORY:

SOB in a patient with a history of pulmonary emboli. 



COMPARISON:

Comparison made with CTA of the chest dated 11/22/2017.



TECHNIQUE:

Chest PA and lateral



FINDINGS:



LUNGS:

Mild bibasilar atelectasis with suspected small effusions left larger 

than right. .  Questionable small effusions.



PLEURA:

As above. No apparent pneumothorax



CARDIOVASCULAR:

Nor cardiomegaly.



OSSEOUS STRUCTURES:

No significant abnormalities.



VISUALIZED UPPER ABDOMEN:

Normal.



OTHER FINDINGS:

None.



IMPRESSION:

Mild bibasilar atelectasis with suspected small effusions left larger 

than right. .  Questionable small effusions.

## 2017-11-28 LAB
ALBUMIN/GLOB SERPL: 1 {RATIO} (ref 1–2.1)
ALP SERPL-CCNC: 57 U/L (ref 38–126)
ALT SERPL-CCNC: 96 U/L (ref 21–72)
AST SERPL-CCNC: 29 U/L (ref 17–59)
BASOPHILS # BLD AUTO: 0 K/UL (ref 0–0.2)
BASOPHILS NFR BLD: 0.2 % (ref 0–2)
BILIRUB SERPL-MCNC: 0.4 MG/DL (ref 0.2–1.3)
BUN SERPL-MCNC: 26 MG/DL (ref 9–20)
CALCIUM SERPL-MCNC: 9 MG/DL (ref 8.6–10.4)
CHLORIDE SERPL-SCNC: 101 MMOL/L (ref 98–107)
CO2 SERPL-SCNC: 26 MMOL/L (ref 22–30)
EOSINOPHIL # BLD AUTO: 0 K/UL (ref 0–0.7)
EOSINOPHIL NFR BLD: 0.2 % (ref 0–4)
ERYTHROCYTE [DISTWIDTH] IN BLOOD BY AUTOMATED COUNT: 13.6 % (ref 11.5–14.5)
GLOBULIN SER-MCNC: 3.7 GM/DL (ref 2.2–3.9)
GLUCOSE SERPL-MCNC: 164 MG/DL (ref 75–110)
HCT VFR BLD CALC: 42 % (ref 35–51)
LYMPHOCYTES # BLD AUTO: 0.4 K/UL (ref 1–4.3)
LYMPHOCYTES NFR BLD AUTO: 3.7 % (ref 20–40)
MAGNESIUM SERPL-MCNC: 1.5 MG/DL (ref 1.6–2.3)
MCH RBC QN AUTO: 32.2 PG (ref 27–31)
MCHC RBC AUTO-ENTMCNC: 33.8 G/DL (ref 33–37)
MCV RBC AUTO: 95.1 FL (ref 80–94)
MONOCYTES # BLD: 0.4 K/UL (ref 0–0.8)
MONOCYTES NFR BLD: 4.3 % (ref 0–10)
NEUTROPHILS NFR BLD AUTO: 86 % (ref 50–75)
NRBC BLD AUTO-RTO: 0 % (ref 0–2)
PHOSPHATE SERPL-MCNC: 2.9 MG/DL (ref 2.5–4.5)
PLATELET # BLD: 240 K/UL (ref 130–400)
PMV BLD AUTO: 8.5 FL (ref 7.2–11.7)
POTASSIUM SERPL-SCNC: 4.6 MMOL/L (ref 3.6–5.2)
PROT SERPL-MCNC: 7.5 G/DL (ref 6.3–8.3)
SODIUM SERPL-SCNC: 136 MMOL/L (ref 132–148)
TOTAL CELLS COUNTED BLD: 100
VARIANT LYMPHS NFR BLD MANUAL: 1 % (ref 0–0)
WBC # BLD AUTO: 9.6 K/UL (ref 4.8–10.8)

## 2017-11-28 RX ADMIN — MAGNESIUM SULFATE IN DEXTROSE SCH MLS/HR: 10 INJECTION, SOLUTION INTRAVENOUS at 17:56

## 2017-11-28 RX ADMIN — PANTOPRAZOLE SODIUM SCH MG: 40 TABLET, DELAYED RELEASE ORAL at 11:21

## 2017-11-28 RX ADMIN — MAGNESIUM SULFATE IN DEXTROSE SCH MLS/HR: 10 INJECTION, SOLUTION INTRAVENOUS at 17:25

## 2017-11-28 RX ADMIN — IPRATROPIUM BROMIDE AND ALBUTEROL SULFATE SCH ML: .5; 3 SOLUTION RESPIRATORY (INHALATION) at 21:27

## 2017-11-28 RX ADMIN — METHYLPREDNISOLONE SODIUM SUCCINATE SCH MG: 40 INJECTION, POWDER, FOR SOLUTION INTRAMUSCULAR; INTRAVENOUS at 11:20

## 2017-11-28 RX ADMIN — Medication SCH TAB: at 11:21

## 2017-11-28 RX ADMIN — METHYLPREDNISOLONE SODIUM SUCCINATE SCH MG: 40 INJECTION, POWDER, FOR SOLUTION INTRAMUSCULAR; INTRAVENOUS at 22:13

## 2017-11-28 RX ADMIN — GUAIFENESIN AND CODEINE PHOSPHATE PRN ML: 100; 10 SOLUTION ORAL at 11:20

## 2017-11-28 RX ADMIN — IPRATROPIUM BROMIDE AND ALBUTEROL SULFATE SCH: .5; 3 SOLUTION RESPIRATORY (INHALATION) at 02:58

## 2017-11-28 RX ADMIN — IPRATROPIUM BROMIDE AND ALBUTEROL SULFATE SCH ML: .5; 3 SOLUTION RESPIRATORY (INHALATION) at 14:04

## 2017-11-28 RX ADMIN — IPRATROPIUM BROMIDE AND ALBUTEROL SULFATE SCH ML: .5; 3 SOLUTION RESPIRATORY (INHALATION) at 07:48

## 2017-11-28 NOTE — CP.PCM.PN
Subjective





- Date & Time of Evaluation


Date of Evaluation: 11/28/17


Time of Evaluation: 09:00





- Subjective


Subjective: 





Patient seen and evaluated


Denies chest pain


Improved breathing


PE on Eliquis





Objective





- Vital Signs/Intake and Output


Vital Signs (last 24 hours): 


 











Temp Pulse Resp BP Pulse Ox


 


 98.5 F   98 H  22   129/89   97 


 


 11/28/17 20:00  11/28/17 20:00  11/28/17 20:00  11/28/17 20:00  11/28/17 20:00








Intake and Output: 


 











 11/28/17 11/29/17





 18:59 06:59


 


Intake Total 1920 


 


Output Total 1200 


 


Balance 720 














- Medications


Medications: 


 Current Medications





Acetaminophen (Tylenol 325mg Tab)  650 mg PO Q6 PRN


   PRN Reason: Pain, moderate (4-7)


   Last Admin: 11/23/17 22:30 Dose:  650 mg


Albuterol/Ipratropium (Duoneb 3 Mg/0.5 Mg (3 Ml) Ud)  3 ml INH RQ6 [facility]


   Last Admin: 11/28/17 21:27 Dose:  3 ml


Amlodipine Besylate (Norvasc)  5 mg PO DAILY [facility]


   Last Admin: 11/28/17 11:21 Dose:  5 mg


Apixaban (Eliquis)  10 mg PO BID [facility]


   Stop: 11/30/17 18:01


   Last Admin: 11/28/17 17:25 Dose:  10 mg


Apixaban (Eliquis)  5 mg PO BID [facility]


Chlordiazepoxide (Librium)  25 mg PO Q8 PRN


   PRN Reason: withdrawal


   Last Admin: 11/28/17 22:13 Dose:  25 mg


Folic Acid (Folic Acid)  1 mg PO DAILY [facility]


   Last Admin: 11/28/17 11:21 Dose:  1 mg


Guaifenesin/Codeine Phosphate (Guaifenesin/Codeine)  10 ml PO Q6H PRN


   PRN Reason: Cough and congestion


   Last Admin: 11/28/17 11:20 Dose:  10 ml


Lisinopril (Zestril)  20 mg PO DAILY [facility]


   Last Admin: 11/28/17 11:20 Dose:  20 mg


Methylprednisolone (Solu-Medrol)  40 mg IVP Q12 [facility]


   Last Admin: 11/28/17 22:13 Dose:  40 mg


Multivitamins (Hexavitamin)  1 tab PO DAILY [facility]


   Last Admin: 11/28/17 11:21 Dose:  1 tab


Pantoprazole Sodium (Protonix Ec Tab)  40 mg PO DAILY [facility]


   Last Admin: 11/28/17 11:21 Dose:  40 mg


Thiamine HCl (Vitamin B1 Tab)  100 mg PO DAILY [facility]


   Last Admin: 11/28/17 11:21 Dose:  100 mg











- Labs


Labs: 


 





 11/28/17 09:57 





 11/28/17 09:57 





 











PT  13.0 SECONDS (9.7-12.2)  H  11/22/17  16:05    


 


INR  1.2   11/22/17  16:05    


 


APTT  158 SECONDS (21-34)  H* D 11/24/17  06:36

## 2017-11-28 NOTE — CP.PCM.PN
Subjective





- Date & Time of Evaluation


Date of Evaluation: 11/28/17


Time of Evaluation: 10:00





- Subjective


Subjective: 





PGY3 on medicine Dr. Nloen service:





Pt seen and examined at bedside this morning. Pt complains of dry cough for the 

past few days and getting better. Pt denied acute events overnight. Pt reports 

having sedentary lifestyle after retired an year, consisting of laying on bed 12

+ hours a day and reading in chair for the rest of his time. Pt reports no 

family history of coagulopathies as well. 





Objective





- Vital Signs/Intake and Output


Vital Signs (last 24 hours): 


 











Temp Pulse Resp BP Pulse Ox


 


 97.8 F   105 H  20   149/94 H  96 


 


 11/27/17 20:00  11/27/17 21:00  11/27/17 21:00  11/27/17 22:05  11/27/17 21:00








Intake and Output: 


 











 11/27/17 11/28/17





 18:59 06:59


 


Intake Total 500 


 


Output Total 500 


 


Balance 0 














- Medications


Medications: 


 Current Medications





Acetaminophen (Tylenol 325mg Tab)  650 mg PO Q6 PRN


   PRN Reason: Pain, moderate (4-7)


   Last Admin: 11/23/17 22:30 Dose:  650 mg


Albuterol/Ipratropium (Duoneb 3 Mg/0.5 Mg (3 Ml) Ud)  3 ml INH RQ6 [facility]


   Last Admin: 11/28/17 02:58 Dose:  Not Given


Amlodipine Besylate (Norvasc)  5 mg PO DAILY [facility]


   Last Admin: 11/27/17 09:36 Dose:  5 mg


Apixaban (Eliquis)  10 mg PO BID [facility]


   Stop: 11/30/17 18:01


   Last Admin: 11/27/17 17:37 Dose:  10 mg


Apixaban (Eliquis)  5 mg PO BID [facility]


Chlordiazepoxide (Librium)  25 mg PO Q8 [facility]


   Last Admin: 11/28/17 06:41 Dose:  25 mg


Folic Acid (Folic Acid)  1 mg PO DAILY [facility]


   Last Admin: 11/27/17 09:37 Dose:  1 mg


Lisinopril (Zestril)  20 mg PO DAILY [facility]


   Last Admin: 11/27/17 09:36 Dose:  20 mg


Methylprednisolone (Solu-Medrol)  40 mg IVP Q12 [facility]


   Last Admin: 11/27/17 21:54 Dose:  40 mg


Multivitamins (Hexavitamin)  1 tab PO DAILY [facility]


   Last Admin: 11/27/17 09:36 Dose:  1 tab


Pantoprazole Sodium (Protonix Ec Tab)  40 mg PO DAILY [facility]


   Last Admin: 11/27/17 09:36 Dose:  40 mg


Promethazine HCl/Codeine (Phenergan/Codeine Oral Syrup)  5 ml PO Q6H PRN


   PRN Reason: cough


   Last Admin: 11/27/17 21:55 Dose:  5 ml


Thiamine HCl (Vitamin B1 Tab)  100 mg PO DAILY [facility]


   Last Admin: 11/27/17 09:40 Dose:  100 mg











- Labs


Labs: 


 





 11/27/17 06:20 





 11/27/17 06:20 





 











PT  13.0 SECONDS (9.7-12.2)  H  11/22/17  16:05    


 


INR  1.2   11/22/17  16:05    


 


APTT  158 SECONDS (21-34)  H* D 11/24/17  06:36    














- Constitutional


Appears: Non-toxic, No Acute Distress





- Head Exam


Head Exam: NORMOCEPHALIC





- Eye Exam


Eye Exam: Normal appearance


Pupil Exam: NORMAL ACCOMODATION





- ENT Exam


ENT Exam: Mucous Membranes Moist





- Respiratory Exam


Respiratory Exam: Clear to Ausculation Bilateral, Rales (bilateral base), 

NORMAL BREATHING PATTERN





- Cardiovascular Exam


Cardiovascular Exam: REGULAR RHYTHM, +S1, +S2.  absent: Gallop, Rubs





- GI/Abdominal Exam


GI & Abdominal Exam: Soft, Normal Bowel Sounds.  absent: Tenderness





- Neurological Exam


Neurological Exam: Alert, Awake, Oriented x3





- Psychiatric Exam


Psychiatric exam: Normal Mood





- Skin


Skin Exam: Intact





Assessment and Plan





- Assessment and Plan (Free Text)


Assessment: 





Submassive PE; improving


-patient will be started on Eliquis 10mg BID today; will need for one week and 

then 5mg BID 


-patient will need CT w/ contrast at some point in the future as per 

pulmonology for PE monitoring


-patient will need hypercoagable workup when off NOAC


-troponin leak most likely 2/2 to RV strain from PE


-repeat CXR showed small pleural effusion L>R


-Solumedrol 40mg IV q12H


-Heme Dr. Perez consulted, help appreciated


-F/U hypercoag workup 





Elevated BP


-Lisinopril 20mg PO daily





Elevated blood sugars


-A1c 5.7





EtOH use


-[facility] protocol


-Librium PRN


-Folic Acid/Thiamine daily 





Prophylaxis


-Eliquis


-Protonix


-PT/OT, placement pending





Management as per Dr. Nolen

## 2017-11-28 NOTE — CP.PCM.CON
<Devyn MCLAUGHLINSheila PHANI - Last Filed: 17 19:01>





History of Present Illness





- History of Present Illness


History of Present Illness: 





Consult note for Dr. Perez:





Patient is a 62 year old male with PMHx HTN, alcohol abuse who was admitted for 

worsening dyspnea.  Patient reports feeling "winded" for the past month.  

Patient admits to sedentary lifestyle since retiring from his job as a .  

Patient reports 30 pound weight loss in the past 1-2 months due in part to 

cutting back on alcohol and dietary changes.  Patient reports feeling a similar 

chest pain at the age of 42 when he was diagnosed with pneumonia.  Patient 

states at that time he needed to have a chest tube place with further surgical 

involvement for the infection and effusion.  In the ER on admission, bilateral 

proximal pulmonary embolism found on CTA.  Patient denies routine colonoscopy.  

Patient also reports intense phobia of all invasive procedures.  Hematology 

consulted evaluation of possible hypercoaguability.





PMH HTN, alcohol abuse


PSH surgery in left lung 20 yrs ago for infection, tonsillectomy


Social lives alone, denies smoking, illicit drugs, but admits to drinking 6-10 

beers on near daily basis; retired 


Allergies NKDA


Family history Father had heart attack age 45, mom  age 70- cause unknown


Meds Lisinopril/HCTZ  daily





Review of Systems





- Constitutional


Constitutional: absent: Chills, Fever





- EENT


Nose/Mouth/Throat: absent: Nasal Congestion





- Cardiovascular


Cardiovascular: Dyspnea (improved).  absent: Chest Pain





- Respiratory


Respiratory: absent: Cough





- Gastrointestinal


Gastrointestinal: absent: Nausea, Vomiting





- Genitourinary


Genitourinary: absent: Dysuria





- Musculoskeletal


Musculoskeletal: absent: Numbness





- Integumentary


Integumentary: absent: Rash, Swelling, Unusual Bruising





- Neurological


Neurological: absent: Confusion





Past Patient History





- Past Medical History & Family History


Past Medical History?: Yes





- Past Social History


Smoking Status: Never Smoked


Alcohol: > 2 Drinks/Day


Home Situation {Lives}: Alone


Domestic Violence: Negative





- CARDIAC


Hx Hypertension: Yes





- PULMONARY


Hx Respiratory Disorders: No





- NEUROLOGICAL


Hx Neurological Disorder: No





- HEENT


Hx HEENT Problems: No





- RENAL


Hx Chronic Kidney Disease: No





- ENDOCRINE/METABOLIC


Hx Endocrine Disorders: No





- HEMATOLOGICAL/ONCOLOGICAL


Hx Blood Disorders: No





- INTEGUMENTARY


Hx Dermatological Problems: No





- MUSCULOSKELETAL/RHEUMATOLOGICAL


Hx Musculoskeletal Disorders: No





- GASTROINTESTINAL


Hx Gastrointestinal Disorders: No





- GENITOURINARY/GYNECOLOGICAL


Hx Genitourinary Disorders: No





- PSYCHIATRIC


Hx Substance Use: No





- SURGICAL HISTORY


Hx Surgeries: Yes


Other/Comment: as per patient, "years ago when I had pneumonia. A surgery in my 

lungs, I'm not sure what"





- ANESTHESIA


Hx Anesthesia: Yes


Hx Anesthesia Reactions: No





Meds


Allergies/Adverse Reactions: 


 Allergies











Allergy/AdvReac Type Severity Reaction Status Date / Time


 


No Known Allergies Allergy   Verified 17 14:52














- Medications


Medications: 


 Current Medications





Acetaminophen (Tylenol 325mg Tab)  650 mg PO Q6 PRN


   PRN Reason: Pain, moderate (4-7)


   Last Admin: 17 22:30 Dose:  650 mg


Albuterol/Ipratropium (Duoneb 3 Mg/0.5 Mg (3 Ml) Ud)  3 ml INH RQ6 [facility]


   Last Admin: 17 07:48 Dose:  3 ml


Amlodipine Besylate (Norvasc)  5 mg PO DAILY [facility]


   Last Admin: 17 09:36 Dose:  5 mg


Apixaban (Eliquis)  10 mg PO BID [facility]


   Stop: 17 18:01


   Last Admin: 17 17:37 Dose:  10 mg


Apixaban (Eliquis)  5 mg PO BID [facility]


Chlordiazepoxide (Librium)  25 mg PO Q8 [facility]


   Last Admin: 17 06:41 Dose:  25 mg


Folic Acid (Folic Acid)  1 mg PO DAILY [facility]


   Last Admin: 17 09:37 Dose:  1 mg


Guaifenesin/Codeine Phosphate (Guaifenesin/Codeine)  10 ml PO Q6H PRN


   PRN Reason: Cough and congestion


Lisinopril (Zestril)  20 mg PO DAILY [facility]


   Last Admin: 17 09:36 Dose:  20 mg


Methylprednisolone (Solu-Medrol)  40 mg IVP Q12 [facility]


   Last Admin: 17 21:54 Dose:  40 mg


Multivitamins (Hexavitamin)  1 tab PO DAILY [facility]


   Last Admin: 17 09:36 Dose:  1 tab


Pantoprazole Sodium (Protonix Ec Tab)  40 mg PO DAILY [facility]


   Last Admin: 17 09:36 Dose:  40 mg


Promethazine HCl/Codeine (Phenergan/Codeine Oral Syrup)  5 ml PO Q6H PRN


   PRN Reason: cough


   Last Admin: 17 21:55 Dose:  5 ml


Thiamine HCl (Vitamin B1 Tab)  100 mg PO DAILY [facility]


   Last Admin: 17 09:40 Dose:  100 mg











Physical Exam





- Constitutional


Appears: Non-toxic, No Acute Distress





- Head Exam


Head Exam: ATRAUMATIC, NORMOCEPHALIC





- Eye Exam


Eye Exam: EOMI





- ENT Exam


ENT Exam: Mucous Membranes Moist





- Respiratory Exam


Respiratory Exam: Rales (bases), NORMAL BREATHING PATTERN





- Cardiovascular Exam


Cardiovascular Exam: +S1, +S2





- GI/Abdominal Exam


GI & Abdominal Exam: Soft





- Neurological Exam


Neurological exam: Alert





- Skin


Skin Exam: Warm





Results





- Vital Signs


Recent Vital Signs: 


 Last Vital Signs











Temp  98 F   17 04:00


 


Pulse  105 H  17 21:00


 


Resp  20   17 21:00


 


BP  149/94 H  17 22:05


 


Pulse Ox  96   17 21:00














- Labs


Result Diagrams: 


 17 09:57





 17 09:57


Labs: 


 Laboratory Results - last 24 hr











  17





  09:57 09:57


 


WBC  9.6  D 


 


RBC  4.42 


 


Hgb  14.2 


 


Hct  42.0 


 


MCV  95.1 H 


 


MCH  32.2 H 


 


MCHC  33.8 


 


RDW  13.6 


 


Plt Count  240 


 


MPV  8.5 


 


Neut % (Auto)  91.6 H 


 


Lymph % (Auto)  3.7 L 


 


Mono % (Auto)  4.3 


 


Eos % (Auto)  0.2 


 


Baso % (Auto)  0.2 


 


Neut #  8.8 H 


 


Lymph #  0.4 L 


 


Mono #  0.4 


 


Eos #  0.0 


 


Baso #  0.0 


 


Neutrophils % (Manual)  86 H 


 


Band Neutrophils %  7 H 


 


Lymphocytes % (Manual)  2 L 


 


Reactive Lymphs %  1 H 


 


Monocytes % (Manual)  4 


 


Platelet Estimate  Normal 


 


RBC Morphology  Normal 


 


Sodium   136


 


Potassium   4.6


 


Chloride   101


 


Carbon Dioxide   26


 


Anion Gap   14


 


BUN   26 H


 


Creatinine   1.4


 


Est GFR ( Amer)   > 60


 


Est GFR (Non-Af Amer)   51


 


Random Glucose   164 H


 


Calcium   9.0


 


Phosphorus   2.9


 


Magnesium   1.5 L


 


Total Bilirubin   0.4


 


AST   29


 


ALT   96 H D


 


Alkaline Phosphatase   57


 


Total Protein   7.5


 


Albumin   3.8


 


Globulin   3.7


 


Albumin/Globulin Ratio   1.0














Assessment & Plan





- Assessment and Plan (Free Text)


Assessment: 





62 year old male with Hx HTN and alcohol abuse with DVT and bilateral pulmonary 

emboli





DVT and Bilateral Pulmonary embolism


acute thrombosis right popliteal and peroneal veins with severe reduction 

venous return





CTA: Large bilateral pulmonary emboli involving both the right and left main 

pulmonary arteries and subsequent distal branches.  4 mm nodule inferior left 

upper lobe. Recommend twelve month follow-up CT to assess for stability





Patient currently refusing IVC filter and EKOS





patient on Eliquis 10mg PO BID for 7 days, then to be on Eliquis 5mg PO BID


check antiphospholipid antibody, factor V Leiden, prothrombin gene mutation 

while inpatient


remainder of work up to be done as outpatient 





patient has not had colonoscopy and there is concern for occult malignancy.  

Patient refuses invasive procedures but is agreeable for CT scan.  


If renal function improved tomorrow, will check CT abdomen/ pelvis to rule out 

possible malignancy





Plan as per Dr. Perez








<Sachin Perez - Last Filed: 17 10:52>





Meds





- Medications


Medications: 


 Current Medications





Acetaminophen (Tylenol 325mg Tab)  650 mg PO Q6 PRN


   PRN Reason: Pain, moderate (4-7)


   Last Admin: 17 22:30 Dose:  650 mg


Albuterol/Ipratropium (Duoneb 3 Mg/0.5 Mg (3 Ml) Ud)  3 ml INH RQ6 [facility]


   Last Admin: 17 07:39 Dose:  3 ml


Amlodipine Besylate (Norvasc)  5 mg PO DAILY [facility]


   Last Admin: 17 11:21 Dose:  5 mg


Apixaban (Eliquis)  10 mg PO BID [facility]


   Stop: 17 18:01


   Last Admin: 17 17:25 Dose:  10 mg


Apixaban (Eliquis)  5 mg PO BID [facility]


Chlordiazepoxide (Librium)  25 mg PO Q8 PRN


   PRN Reason: withdrawal


   Last Admin: 17 22:13 Dose:  25 mg


Folic Acid (Folic Acid)  1 mg PO DAILY [facility]


   Last Admin: 17 11:21 Dose:  1 mg


Guaifenesin/Codeine Phosphate (Guaifenesin/Codeine)  10 ml PO Q6H PRN


   PRN Reason: Cough and congestion


   Last Admin: 17 11:20 Dose:  10 ml


Lisinopril (Zestril)  20 mg PO DAILY [facility]


   Last Admin: 17 11:20 Dose:  20 mg


Methylprednisolone (Solu-Medrol)  40 mg IVP Q12 [facility]


   Last Admin: 17 22:13 Dose:  40 mg


Multivitamins (Hexavitamin)  1 tab PO DAILY [facility]


   Last Admin: 17 11:21 Dose:  1 tab


Pantoprazole Sodium (Protonix Ec Tab)  40 mg PO DAILY [facility]


   Last Admin: 17 11:21 Dose:  40 mg


Thiamine HCl (Vitamin B1 Tab)  100 mg PO DAILY [facility]


   Last Admin: 17 11:21 Dose:  100 mg











Results





- Vital Signs


Recent Vital Signs: 


 Last Vital Signs











Temp  97.2 F L  17 04:00


 


Pulse  86   17 04:00


 


Resp  20   17 04:00


 


BP  114/75   17 04:00


 


Pulse Ox  98   17 04:00














- Labs


Result Diagrams: 


 17 06:29





 17 06:29


Labs: 


 Laboratory Results - last 24 hr











  17





  09:57 06:29 06:29


 


WBC   8.7 


 


RBC   3.41 L 


 


Hgb   10.8 L D 


 


Hct   32.8 L 


 


MCV   96.4 H 


 


MCH   31.6 H 


 


MCHC   32.8 L 


 


RDW   13.4 


 


Plt Count   206 


 


MPV   8.9 


 


Neut % (Auto)   94.1 H 


 


Lymph % (Auto)   3.3 L 


 


Mono % (Auto)   2.4 


 


Eos % (Auto)   0.1 


 


Baso % (Auto)   0.1 


 


Neut #   8.2 H 


 


Lymph #   0.3 L 


 


Mono #   0.2 


 


Eos #   0.0 


 


Baso #   0.0 


 


Neutrophils % (Manual)  86 H  95 H 


 


Band Neutrophils %  7 H  


 


Lymphocytes % (Manual)  2 L  4 L 


 


Reactive Lymphs %  1 H  


 


Monocytes % (Manual)  4  1 


 


Platelet Estimate  Normal  Normal 


 


RBC Morphology  Normal  Normal 


 


Sodium    138


 


Potassium    5.1


 


Chloride    102


 


Carbon Dioxide    26


 


Anion Gap    15


 


BUN    28 H


 


Creatinine    1.3


 


Est GFR ( Amer)    > 60


 


Est GFR (Non-Af Amer)    56


 


Random Glucose    138 H


 


Calcium    8.5 L


 


Magnesium    2.1


 


Total Bilirubin    0.4


 


AST    31


 


ALT    78 H


 


Alkaline Phosphatase    48


 


Total Protein    7.0


 


Albumin    3.5


 


Globulin    3.5


 


Albumin/Globulin Ratio    1.0














Assessment & Plan





- Assessment and Plan (Free Text)


Assessment: 





Pt seen and examined, agree with residents consult.  Pt admitted with 

unprovoked DVT/PE on therapeutic anticoagulation and cardiac monitoring.  

Deferred IVC filter and EKOS.  Inherited thrombophilia w/u sent - father had MI 

at age 44.  Has deferred colonoscopy in the past; will check CT A/P with PO+IV 

contrast to rule out occult malignancy.  Thank you for this interesting 

consult.

## 2017-11-28 NOTE — PN
DATE:  11/26/2017



The patient is complaining of weakness, cough, and shortness of breath. 

The patient given bronchodilators and cough medications.







__________________________________________

Alpa Nolen MD





DD:  11/26/2017 16:51:45

DT:  11/26/2017 17:35:47

Job # [id number]

## 2017-11-29 VITALS — RESPIRATION RATE: 20 BRPM

## 2017-11-29 LAB
ALBUMIN/GLOB SERPL: 1 {RATIO} (ref 1–2.1)
ALP SERPL-CCNC: 48 U/L (ref 38–126)
ALT SERPL-CCNC: 78 U/L (ref 21–72)
AST SERPL-CCNC: 31 U/L (ref 17–59)
BASOPHILS # BLD AUTO: 0 K/UL (ref 0–0.2)
BASOPHILS NFR BLD: 0.1 % (ref 0–2)
BILIRUB SERPL-MCNC: 0.4 MG/DL (ref 0.2–1.3)
BUN SERPL-MCNC: 28 MG/DL (ref 9–20)
CALCIUM SERPL-MCNC: 8.5 MG/DL (ref 8.6–10.4)
CHLORIDE SERPL-SCNC: 102 MMOL/L (ref 98–107)
CO2 SERPL-SCNC: 26 MMOL/L (ref 22–30)
EOSINOPHIL # BLD AUTO: 0 K/UL (ref 0–0.7)
EOSINOPHIL NFR BLD: 0.1 % (ref 0–4)
ERYTHROCYTE [DISTWIDTH] IN BLOOD BY AUTOMATED COUNT: 13.4 % (ref 11.5–14.5)
FOLATE SERPL-MCNC: 10.1 NG/ML
GLOBULIN SER-MCNC: 3.5 GM/DL (ref 2.2–3.9)
GLUCOSE SERPL-MCNC: 138 MG/DL (ref 75–110)
HCT VFR BLD CALC: 32.8 % (ref 35–51)
LYMPHOCYTES # BLD AUTO: 0.3 K/UL (ref 1–4.3)
LYMPHOCYTES NFR BLD AUTO: 3.3 % (ref 20–40)
MAGNESIUM SERPL-MCNC: 2.1 MG/DL (ref 1.6–2.3)
MCH RBC QN AUTO: 31.6 PG (ref 27–31)
MCHC RBC AUTO-ENTMCNC: 32.8 G/DL (ref 33–37)
MCV RBC AUTO: 96.4 FL (ref 80–94)
MONOCYTES # BLD: 0.2 K/UL (ref 0–0.8)
MONOCYTES NFR BLD: 2.4 % (ref 0–10)
NEUTROPHILS NFR BLD AUTO: 95 % (ref 50–75)
NRBC BLD AUTO-RTO: 0 % (ref 0–2)
PLATELET # BLD: 206 K/UL (ref 130–400)
PMV BLD AUTO: 8.9 FL (ref 7.2–11.7)
POTASSIUM SERPL-SCNC: 5.1 MMOL/L (ref 3.6–5.2)
PROT SERPL-MCNC: 7 G/DL (ref 6.3–8.3)
SODIUM SERPL-SCNC: 138 MMOL/L (ref 132–148)
TOTAL CELLS COUNTED BLD: 100
WBC # BLD AUTO: 8.7 K/UL (ref 4.8–10.8)

## 2017-11-29 RX ADMIN — PANTOPRAZOLE SODIUM SCH MG: 40 TABLET, DELAYED RELEASE ORAL at 10:59

## 2017-11-29 RX ADMIN — IPRATROPIUM BROMIDE AND ALBUTEROL SULFATE SCH: .5; 3 SOLUTION RESPIRATORY (INHALATION) at 20:31

## 2017-11-29 RX ADMIN — Medication SCH TAB: at 10:59

## 2017-11-29 RX ADMIN — IPRATROPIUM BROMIDE AND ALBUTEROL SULFATE SCH: .5; 3 SOLUTION RESPIRATORY (INHALATION) at 01:06

## 2017-11-29 RX ADMIN — IPRATROPIUM BROMIDE AND ALBUTEROL SULFATE SCH ML: .5; 3 SOLUTION RESPIRATORY (INHALATION) at 14:02

## 2017-11-29 RX ADMIN — GUAIFENESIN AND CODEINE PHOSPHATE PRN ML: 100; 10 SOLUTION ORAL at 12:23

## 2017-11-29 RX ADMIN — IPRATROPIUM BROMIDE AND ALBUTEROL SULFATE SCH ML: .5; 3 SOLUTION RESPIRATORY (INHALATION) at 07:39

## 2017-11-29 RX ADMIN — METHYLPREDNISOLONE SODIUM SUCCINATE SCH MG: 40 INJECTION, POWDER, FOR SOLUTION INTRAMUSCULAR; INTRAVENOUS at 10:59

## 2017-11-29 RX ADMIN — METHYLPREDNISOLONE SODIUM SUCCINATE SCH MG: 40 INJECTION, POWDER, FOR SOLUTION INTRAMUSCULAR; INTRAVENOUS at 21:20

## 2017-11-29 NOTE — CP.PCM.PN
Subjective





- Date & Time of Evaluation


Date of Evaluation: 11/29/17


Time of Evaluation: 11:00





- Subjective


Subjective: 











Dr. Nolen note:








Patient is a 62 year old with a history of etoh use and poor follow up is 

admitted for a massive bilateral PE.  He is currently on Eliquis, he says his 

breathing is better but that is feels very weak especially when trying to get 

out of bed or walk.  He is still coughing however that has also improved.  He 

denies any signs of bleeding, chest pain, or shortness of breath.  He says when 

he was first admitted he says he had very sharp pain.  





Objective





- Vital Signs/Intake and Output


Vital Signs (last 24 hours): 


 











Temp Pulse Resp BP Pulse Ox


 


 97.2 F L  86   20   114/75   98 


 


 11/29/17 04:00  11/29/17 04:00  11/29/17 04:00  11/29/17 04:00  11/29/17 04:00








Intake and Output: 


 











 11/29/17 11/29/17





 06:59 18:59


 


Intake Total 750 


 


Output Total 900 


 


Balance -150 














- Medications


Medications: 


 Current Medications





Acetaminophen (Tylenol 325mg Tab)  650 mg PO Q6 PRN


   PRN Reason: Pain, moderate (4-7)


   Last Admin: 11/23/17 22:30 Dose:  650 mg


Albuterol/Ipratropium (Duoneb 3 Mg/0.5 Mg (3 Ml) Ud)  3 ml INH RQ6 [facility]


   Last Admin: 11/29/17 07:39 Dose:  3 ml


Amlodipine Besylate (Norvasc)  5 mg PO DAILY [facility]


   Last Admin: 11/28/17 11:21 Dose:  5 mg


Apixaban (Eliquis)  10 mg PO BID [facility]


   Stop: 11/30/17 18:01


   Last Admin: 11/28/17 17:25 Dose:  10 mg


Apixaban (Eliquis)  5 mg PO BID [facility]


Chlordiazepoxide (Librium)  25 mg PO Q8 PRN


   PRN Reason: withdrawal


   Last Admin: 11/28/17 22:13 Dose:  25 mg


Folic Acid (Folic Acid)  1 mg PO DAILY [facility]


   Last Admin: 11/28/17 11:21 Dose:  1 mg


Guaifenesin/Codeine Phosphate (Guaifenesin/Codeine)  10 ml PO Q6H PRN


   PRN Reason: Cough and congestion


   Last Admin: 11/28/17 11:20 Dose:  10 ml


Lisinopril (Zestril)  20 mg PO DAILY [facility]


   Last Admin: 11/28/17 11:20 Dose:  20 mg


Methylprednisolone (Solu-Medrol)  40 mg IVP Q12 [facility]


   Last Admin: 11/28/17 22:13 Dose:  40 mg


Multivitamins (Hexavitamin)  1 tab PO DAILY [facility]


   Last Admin: 11/28/17 11:21 Dose:  1 tab


Pantoprazole Sodium (Protonix Ec Tab)  40 mg PO DAILY [facility]


   Last Admin: 11/28/17 11:21 Dose:  40 mg


Thiamine HCl (Vitamin B1 Tab)  100 mg PO DAILY [facility]


   Last Admin: 11/28/17 11:21 Dose:  100 mg











- Labs


Labs: 


 





 11/29/17 06:29 





 11/29/17 06:29 





 











PT  13.0 SECONDS (9.7-12.2)  H  11/22/17  16:05    


 


INR  1.2   11/22/17  16:05    


 


APTT  158 SECONDS (21-34)  H* D 11/24/17  06:36    














- Constitutional


Appears: Non-toxic, No Acute Distress





- Eye Exam


Eye Exam: Normal appearance





- Respiratory Exam


Respiratory Exam: Clear to Ausculation Bilateral.  absent: Rhonchi, Wheezes





- Cardiovascular Exam


Cardiovascular Exam: REGULAR RHYTHM, +S1, +S2





- GI/Abdominal Exam


GI & Abdominal Exam: Normal Bowel Sounds





- Extremities Exam


Extremities Exam: absent: Calf Tenderness





- Neurological Exam


Neurological Exam: Alert, Awake, Oriented x3





- Skin


Skin Exam: Intact, Warm





Assessment and Plan





- Assessment and Plan (Free Text)


Assessment: 





Submassive PE; improving


11/29:  Patient on PO Eliquis, breathing has improved however he is 

deconditioned, he may benefit from CASSANDRA, will get physical therapy eval and case 

management as well for possible CASSANDRA.  Patient can have work up for occult 

cancer such as colonscopy as outpatient.  continue with Eliquis, follow up  

hypercoag work up.   solumedrol 40mg Q12h.  





patient will be started on Eliquis 10mg BID today; will need for one week and 

then 5mg BID 


-patient will need CT w/ contrast at some point in the future as per 

pulmonology for PE monitoring


-patient will need hypercoagable workup when off NOAC


-troponin leak most likely 2/2 to RV strain from PE


-repeat CXR showed small pleural effusion L>R


-Solumedrol 40mg IV q12H


-Heme Dr. Perez consulted, help appreciated


-F/U hypercoag workup 





Elevated BP


-Lisinopril 20mg PO daily





Elevated blood sugars


-A1c 5.7





EtOH use


-[facility] protocol


-Librium PRN


-Folic Acid/Thiamine daily 





Prophylaxis





Eliquis


-Protonix


-PT/OT, placement pending





disposition:


62 year old male with hisotry of Etoh use, and elevated BP is here with a 

bilateral PE, anticoaguated now with Eliquis.  This is a first time unprovoked 

event.  Will get PT and case management for possible CASSANDRA discharge tomorrow.

## 2017-11-29 NOTE — CP.PCM.PN
<Sheila Shepard DO - Last Filed: 11/29/17 13:25>





Subjective





- Date & Time of Evaluation


Date of Evaluation: 11/29/17


Time of Evaluation: 13:25





- Subjective


Subjective: 





Progress note for Dr. Perez





Patient seen and examined.  Patient states he is feeling better.  Patient 

reports feeling easily fatigued when walking.  Hemoglobin drop noted from 

yesterday but patient denies hematuria or hematochezia. 





Objective





- Vital Signs/Intake and Output


Vital Signs (last 24 hours): 


 











Temp Pulse Resp BP Pulse Ox


 


 97.2 F L  86   20   114/75   98 


 


 11/29/17 04:00  11/29/17 04:00  11/29/17 04:00  11/29/17 04:00  11/29/17 04:00








Intake and Output: 


 











 11/29/17 11/29/17





 06:59 18:59


 


Intake Total 750 


 


Output Total 900 


 


Balance -150 














- Medications


Medications: 


 Current Medications





Acetaminophen (Tylenol 325mg Tab)  650 mg PO Q6 PRN


   PRN Reason: Pain, moderate (4-7)


   Last Admin: 11/23/17 22:30 Dose:  650 mg


Albuterol/Ipratropium (Duoneb 3 Mg/0.5 Mg (3 Ml) Ud)  3 ml INH RQ6 [facility]


   Last Admin: 11/29/17 07:39 Dose:  3 ml


Amlodipine Besylate (Norvasc)  5 mg PO DAILY [facility]


   Last Admin: 11/29/17 10:59 Dose:  5 mg


Apixaban (Eliquis)  10 mg PO BID [facility]


   Stop: 11/30/17 18:01


   Last Admin: 11/29/17 10:59 Dose:  10 mg


Apixaban (Eliquis)  5 mg PO BID [facility]


Chlordiazepoxide (Librium)  25 mg PO Q8 PRN


   PRN Reason: withdrawal


   Last Admin: 11/28/17 22:13 Dose:  25 mg


Folic Acid (Folic Acid)  1 mg PO DAILY [facility]


   Last Admin: 11/29/17 10:59 Dose:  1 mg


Guaifenesin/Codeine Phosphate (Guaifenesin/Codeine)  10 ml PO Q6H PRN


   PRN Reason: Cough and congestion


   Last Admin: 11/28/17 11:20 Dose:  10 ml


Lisinopril (Zestril)  20 mg PO DAILY [facility]


   Last Admin: 11/29/17 10:59 Dose:  20 mg


Methylprednisolone (Solu-Medrol)  40 mg IVP Q12 [facility]


   Last Admin: 11/29/17 10:59 Dose:  40 mg


Multivitamins (Hexavitamin)  1 tab PO DAILY [facility]


   Last Admin: 11/29/17 10:59 Dose:  1 tab


Pantoprazole Sodium (Protonix Ec Tab)  40 mg PO DAILY [facility]


   Last Admin: 11/29/17 10:59 Dose:  40 mg


Thiamine HCl (Vitamin B1 Tab)  100 mg PO DAILY [facility]


   Last Admin: 11/29/17 10:59 Dose:  100 mg











- Labs


Labs: 


 





 11/29/17 06:29 





 11/29/17 06:29 





 











PT  13.0 SECONDS (9.7-12.2)  H  11/22/17  16:05    


 


INR  1.2   11/22/17  16:05    


 


APTT  158 SECONDS (21-34)  H* D 11/24/17  06:36    














- Constitutional


Appears: Non-toxic, No Acute Distress





- Head Exam


Head Exam: ATRAUMATIC





- Eye Exam


Eye Exam: EOMI





- ENT Exam


ENT Exam: Mucous Membranes Moist





- Respiratory Exam


Respiratory Exam: Rales (bases), NORMAL BREATHING PATTERN





- Cardiovascular Exam


Cardiovascular Exam: +S1, +S2





- GI/Abdominal Exam


GI & Abdominal Exam: Soft





- Neurological Exam


Neurological Exam: Alert, Awake





- Psychiatric Exam


Psychiatric exam: Normal Affect





- Skin


Skin Exam: Warm





Assessment and Plan





- Assessment and Plan (Free Text)


Assessment: 





Bilateral pulmonary emboli


continue Eliquis therapy





will hold off on CT abdomen/ pelvis as creatinine remains elevated





Anemia


will check ferritin, folate, B12, retic count


hemoglobin drop noted, will continue to monitor





Plan as per Dr. Perez





<Sachin Perez - Last Filed: 11/30/17 18:24>





Objective





- Vital Signs/Intake and Output


Vital Signs (last 24 hours): 


 











Temp Pulse Resp BP Pulse Ox


 


 97.5 F L  96 H  20   149/101 H  96 


 


 11/30/17 15:54  11/30/17 15:54  11/30/17 15:54  11/30/17 15:54  11/30/17 15:54








Intake and Output: 


 











 11/30/17 11/30/17





 06:59 18:59


 


Intake Total  500


 


Balance  500














- Medications


Medications: 


 Current Medications





Acetaminophen (Tylenol 325mg Tab)  650 mg PO Q6 PRN


   PRN Reason: Pain, moderate (4-7)


   Last Admin: 11/23/17 22:30 Dose:  650 mg


Albuterol/Ipratropium (Duoneb 3 Mg/0.5 Mg (3 Ml) Ud)  3 ml INH RQ6 [facility]


   Last Admin: 11/30/17 13:11 Dose:  3 ml


Amlodipine Besylate (Norvasc)  10 mg PO DAILY [facility]


Apixaban (Eliquis)  5 mg PO BID [facility]


Chlordiazepoxide (Librium)  25 mg PO Q8 PRN


   PRN Reason: withdrawal


   Last Admin: 11/30/17 08:52 Dose:  25 mg


Folic Acid (Folic Acid)  1 mg PO DAILY [facility]


   Last Admin: 11/30/17 10:59 Dose:  1 mg


Guaifenesin/Codeine Phosphate (Guaifenesin/Codeine)  10 ml PO Q6H PRN


   PRN Reason: Cough and congestion


   Last Admin: 11/30/17 06:19 Dose:  10 ml


Lisinopril (Zestril)  20 mg PO DAILY [facility]


   Last Admin: 11/30/17 11:00 Dose:  20 mg


Methylprednisolone (Solu-Medrol)  40 mg IVP Q12 [facility]


   Last Admin: 11/30/17 10:59 Dose:  40 mg


Multivitamins (Hexavitamin)  1 tab PO DAILY [facility]


   Last Admin: 11/30/17 11:00 Dose:  1 tab


Pantoprazole Sodium (Protonix Ec Tab)  40 mg PO DAILY [facility]


   Last Admin: 11/30/17 10:59 Dose:  40 mg


Thiamine HCl (Vitamin B1 Tab)  100 mg PO DAILY [facility]


   Last Admin: 11/30/17 11:00 Dose:  100 mg











- Labs


Labs: 


 





 11/30/17 08:22 





 11/30/17 08:22 





 











PT  13.0 SECONDS (9.7-12.2)  H  11/22/17  16:05    


 


INR  1.2   11/22/17  16:05    


 


APTT  158 SECONDS (21-34)  H* D 11/24/17  06:36    














Assessment and Plan





- Assessment and Plan (Free Text)


Assessment: 





Pt seen and examined, agree with residents note.

## 2017-11-30 LAB
ALBUMIN/GLOB SERPL: 1.4 {RATIO} (ref 1–2.1)
ALP SERPL-CCNC: 53 U/L (ref 38–126)
ALT SERPL-CCNC: 84 U/L (ref 21–72)
AST SERPL-CCNC: 24 U/L (ref 17–59)
BASOPHILS # BLD AUTO: 0 K/UL (ref 0–0.2)
BASOPHILS NFR BLD: 0.2 % (ref 0–2)
BILIRUB SERPL-MCNC: 0.7 MG/DL (ref 0.2–1.3)
BUN SERPL-MCNC: 31 MG/DL (ref 9–20)
CALCIUM SERPL-MCNC: 8.9 MG/DL (ref 8.6–10.4)
CHLORIDE SERPL-SCNC: 100 MMOL/L (ref 98–107)
CO2 SERPL-SCNC: 28 MMOL/L (ref 22–30)
EOSINOPHIL # BLD AUTO: 0 K/UL (ref 0–0.7)
EOSINOPHIL NFR BLD: 0 % (ref 0–4)
ERYTHROCYTE [DISTWIDTH] IN BLOOD BY AUTOMATED COUNT: 13.4 % (ref 11.5–14.5)
GLOBULIN SER-MCNC: 2.8 GM/DL (ref 2.2–3.9)
GLUCOSE SERPL-MCNC: 104 MG/DL (ref 75–110)
HCT VFR BLD CALC: 44 % (ref 35–51)
LYMPHOCYTES # BLD AUTO: 0.7 K/UL (ref 1–4.3)
LYMPHOCYTES NFR BLD AUTO: 7.5 % (ref 20–40)
MCH RBC QN AUTO: 32.2 PG (ref 27–31)
MCHC RBC AUTO-ENTMCNC: 33.9 G/DL (ref 33–37)
MCV RBC AUTO: 95.1 FL (ref 80–94)
MONOCYTES # BLD: 0.5 K/UL (ref 0–0.8)
MONOCYTES NFR BLD: 5.5 % (ref 0–10)
NEUTROPHILS NFR BLD AUTO: 86 % (ref 50–75)
NRBC BLD AUTO-RTO: 0 % (ref 0–2)
PLATELET # BLD: 264 K/UL (ref 130–400)
PMV BLD AUTO: 8.6 FL (ref 7.2–11.7)
POTASSIUM SERPL-SCNC: 4.9 MMOL/L (ref 3.6–5.2)
PROT SERPL-MCNC: 6.8 G/DL (ref 6.3–8.3)
SODIUM SERPL-SCNC: 141 MMOL/L (ref 132–148)
TOTAL CELLS COUNTED BLD: 100
VARIANT LYMPHS NFR BLD MANUAL: 3 % (ref 0–0)
WBC # BLD AUTO: 9 K/UL (ref 4.8–10.8)

## 2017-11-30 RX ADMIN — METHYLPREDNISOLONE SODIUM SUCCINATE SCH MG: 40 INJECTION, POWDER, FOR SOLUTION INTRAMUSCULAR; INTRAVENOUS at 21:53

## 2017-11-30 RX ADMIN — GUAIFENESIN AND CODEINE PHOSPHATE PRN ML: 100; 10 SOLUTION ORAL at 01:24

## 2017-11-30 RX ADMIN — PANTOPRAZOLE SODIUM SCH MG: 40 TABLET, DELAYED RELEASE ORAL at 10:59

## 2017-11-30 RX ADMIN — GUAIFENESIN AND CODEINE PHOSPHATE PRN ML: 100; 10 SOLUTION ORAL at 06:19

## 2017-11-30 RX ADMIN — IPRATROPIUM BROMIDE AND ALBUTEROL SULFATE SCH ML: .5; 3 SOLUTION RESPIRATORY (INHALATION) at 01:28

## 2017-11-30 RX ADMIN — METHYLPREDNISOLONE SODIUM SUCCINATE SCH MG: 40 INJECTION, POWDER, FOR SOLUTION INTRAMUSCULAR; INTRAVENOUS at 10:59

## 2017-11-30 RX ADMIN — IPRATROPIUM BROMIDE AND ALBUTEROL SULFATE SCH ML: .5; 3 SOLUTION RESPIRATORY (INHALATION) at 19:50

## 2017-11-30 RX ADMIN — IPRATROPIUM BROMIDE AND ALBUTEROL SULFATE SCH ML: .5; 3 SOLUTION RESPIRATORY (INHALATION) at 13:11

## 2017-11-30 RX ADMIN — Medication SCH TAB: at 11:00

## 2017-11-30 RX ADMIN — GUAIFENESIN AND CODEINE PHOSPHATE PRN ML: 100; 10 SOLUTION ORAL at 22:06

## 2017-11-30 RX ADMIN — IPRATROPIUM BROMIDE AND ALBUTEROL SULFATE SCH ML: .5; 3 SOLUTION RESPIRATORY (INHALATION) at 07:31

## 2017-11-30 NOTE — CP.PCM.PN
Subjective





- Date & Time of Evaluation


Date of Evaluation: 11/30/17


Time of Evaluation: 10:00





- Subjective


Subjective: 





PGY3 on medicine Dr. Nolen service:





Pt seen and examined at bedside this morning. Pt reports feeling slightly 

better than yesterday and coughing less. Sharp pain with breathing persisted 

but improved as well. Currently waiting for placement. 





Objective





- Vital Signs/Intake and Output


Vital Signs (last 24 hours): 


 











Temp Pulse Resp BP Pulse Ox


 


 97.6 F   98 H  20   134/77   97 


 


 11/30/17 08:05  11/30/17 10:57  11/30/17 08:05  11/30/17 10:57  11/30/17 08:05











- Medications


Medications: 


 Current Medications





Acetaminophen (Tylenol 325mg Tab)  650 mg PO Q6 PRN


   PRN Reason: Pain, moderate (4-7)


   Last Admin: 11/23/17 22:30 Dose:  650 mg


Albuterol/Ipratropium (Duoneb 3 Mg/0.5 Mg (3 Ml) Ud)  3 ml INH RQ6 [facility]


   Last Admin: 11/30/17 13:11 Dose:  3 ml


Amlodipine Besylate (Norvasc)  5 mg PO DAILY [facility]


   Last Admin: 11/30/17 11:00 Dose:  5 mg


Apixaban (Eliquis)  10 mg PO BID [facility]


   Stop: 11/30/17 18:01


   Last Admin: 11/30/17 10:59 Dose:  10 mg


Apixaban (Eliquis)  5 mg PO BID [facility]


Chlordiazepoxide (Librium)  25 mg PO Q8 PRN


   PRN Reason: withdrawal


   Last Admin: 11/30/17 08:52 Dose:  25 mg


Folic Acid (Folic Acid)  1 mg PO DAILY [facility]


   Last Admin: 11/30/17 10:59 Dose:  1 mg


Guaifenesin/Codeine Phosphate (Guaifenesin/Codeine)  10 ml PO Q6H PRN


   PRN Reason: Cough and congestion


   Last Admin: 11/30/17 06:19 Dose:  10 ml


Lisinopril (Zestril)  20 mg PO DAILY [facility]


   Last Admin: 11/30/17 11:00 Dose:  20 mg


Methylprednisolone (Solu-Medrol)  40 mg IVP Q12 [facility]


   Last Admin: 11/30/17 10:59 Dose:  40 mg


Multivitamins (Hexavitamin)  1 tab PO DAILY [facility]


   Last Admin: 11/30/17 11:00 Dose:  1 tab


Pantoprazole Sodium (Protonix Ec Tab)  40 mg PO DAILY [facility]


   Last Admin: 11/30/17 10:59 Dose:  40 mg


Thiamine HCl (Vitamin B1 Tab)  100 mg PO DAILY [facility]


   Last Admin: 11/30/17 11:00 Dose:  100 mg











- Labs


Labs: 


 





 11/30/17 08:22 





 11/30/17 08:22 





 











PT  13.0 SECONDS (9.7-12.2)  H  11/22/17  16:05    


 


INR  1.2   11/22/17  16:05    


 


APTT  158 SECONDS (21-34)  H* D 11/24/17  06:36    














- Constitutional


Appears: Non-toxic, No Acute Distress





- Head Exam


Head Exam: NORMOCEPHALIC





- Eye Exam


Eye Exam: Normal appearance


Pupil Exam: NORMAL ACCOMODATION





- ENT Exam


ENT Exam: Mucous Membranes Moist





- Respiratory Exam


Respiratory Exam: Chest Wall Tenderness, Clear to Ausculation Bilateral, NORMAL 

BREATHING PATTERN.  absent: Wheezes





- Cardiovascular Exam


Cardiovascular Exam: REGULAR RHYTHM, +S1, +S2.  absent: Gallop, Rubs





- GI/Abdominal Exam


GI & Abdominal Exam: Soft, Normal Bowel Sounds.  absent: Tenderness





- Neurological Exam


Neurological Exam: Alert, Awake, Oriented x3





- Psychiatric Exam


Psychiatric exam: Normal Mood





- Skin


Skin Exam: Intact





Assessment and Plan





- Assessment and Plan (Free Text)


Assessment: 





Submassive PE; improving


-patient will be started on Eliquis 10mg BID today; will need for one week and 

then 5mg BID on 12/1/17


-patient will need CT w/ contrast at some point in the future as per 

pulmonology for PE monitoring when Cr comes down


-troponin leak most likely 2/2 to RV strain from PE


-repeat CXR showed small pleural effusion L>R


-Solumedrol 40mg IV q12H


-Heme Dr. Perez consulted, help appreciated


-F/U hypercoag workup 





Elevated BP


-Lisinopril 20mg PO daily





Elevated blood sugars


-A1c 5.7





EtOH use


-[facility] protocol


-Librium PRN


-Folic Acid/Thiamine daily 





Prophylaxis


-Eliquis


-Protonix


-PT/OT, placement pending





disposition:


62 year old male with hisotry of Etoh use, and elevated BP is here with a 

bilateral PE, anticoaguated now with Eliquis.  This is a first time unprovoked 

event.  Pending placement to TCU.

## 2017-11-30 NOTE — CP.PCM.PN
<Sheila Shepard DO - Last Filed: 11/30/17 16:30>





Subjective





- Date & Time of Evaluation


Date of Evaluation: 11/30/17


Time of Evaluation: 10:33





- Subjective


Subjective: 





Hematology progress note for Dr. Perez





Patient seen and examined.  Patient states his breathing has improved and he no 

longer has sharp chest pain.  Patient admits to feeling winded with minor 

activity. 





Objective





- Vital Signs/Intake and Output


Vital Signs (last 24 hours): 


 











Temp Pulse Resp BP Pulse Ox


 


 97.6 F   98 H  20   154/94 H  97 


 


 11/30/17 08:05  11/30/17 08:50  11/30/17 08:05  11/30/17 08:50  11/30/17 08:05











- Medications


Medications: 


 Current Medications





Acetaminophen (Tylenol 325mg Tab)  650 mg PO Q6 PRN


   PRN Reason: Pain, moderate (4-7)


   Last Admin: 11/23/17 22:30 Dose:  650 mg


Albuterol/Ipratropium (Duoneb 3 Mg/0.5 Mg (3 Ml) Ud)  3 ml INH RQ6 [facility]


   Last Admin: 11/30/17 07:31 Dose:  3 ml


Amlodipine Besylate (Norvasc)  5 mg PO DAILY [facility]


   Last Admin: 11/29/17 10:59 Dose:  5 mg


Apixaban (Eliquis)  10 mg PO BID [facility]


   Stop: 11/30/17 18:01


   Last Admin: 11/29/17 17:53 Dose:  10 mg


Apixaban (Eliquis)  5 mg PO BID [facility]


Chlordiazepoxide (Librium)  25 mg PO Q8 PRN


   PRN Reason: withdrawal


   Last Admin: 11/30/17 08:52 Dose:  25 mg


Folic Acid (Folic Acid)  1 mg PO DAILY [facility]


   Last Admin: 11/29/17 10:59 Dose:  1 mg


Guaifenesin/Codeine Phosphate (Guaifenesin/Codeine)  10 ml PO Q6H PRN


   PRN Reason: Cough and congestion


   Last Admin: 11/30/17 06:19 Dose:  10 ml


Lisinopril (Zestril)  20 mg PO DAILY [facility]


   Last Admin: 11/29/17 10:59 Dose:  20 mg


Methylprednisolone (Solu-Medrol)  40 mg IVP Q12 [facility]


   Last Admin: 11/29/17 21:20 Dose:  40 mg


Multivitamins (Hexavitamin)  1 tab PO DAILY [facility]


   Last Admin: 11/29/17 10:59 Dose:  1 tab


Pantoprazole Sodium (Protonix Ec Tab)  40 mg PO DAILY [facility]


   Last Admin: 11/29/17 10:59 Dose:  40 mg


Thiamine HCl (Vitamin B1 Tab)  100 mg PO DAILY [facility]


   Last Admin: 11/29/17 10:59 Dose:  100 mg











- Labs


Labs: 


 





 11/30/17 08:22 





 11/30/17 08:22 





 











PT  13.0 SECONDS (9.7-12.2)  H  11/22/17  16:05    


 


INR  1.2   11/22/17  16:05    


 


APTT  158 SECONDS (21-34)  H* D 11/24/17  06:36    














- Constitutional


Appears: No Acute Distress





- Head Exam


Head Exam: ATRAUMATIC, NORMOCEPHALIC





- Eye Exam


Eye Exam: EOMI





- ENT Exam


ENT Exam: Mucous Membranes Moist





- Respiratory Exam


Respiratory Exam: NORMAL BREATHING PATTERN





- Cardiovascular Exam


Cardiovascular Exam: +S1, +S2





- GI/Abdominal Exam


GI & Abdominal Exam: Soft





- Extremities Exam


Extremities Exam: absent: Pedal Edema





- Neurological Exam


Neurological Exam: Alert, Awake





- Psychiatric Exam


Psychiatric exam: Normal Affect





- Skin


Skin Exam: Warm





Assessment and Plan





- Assessment and Plan (Free Text)


Assessment: 





Bilateral pulmonary emboli


continue Eliquis therapy





will hold off on CT abdomen/ pelvis as creatinine remains elevated


Patient can follow as outpatient for further evaluation for hypercoaguability


antiphospholipid Ab, factor V Leiden, prothrombin gene mutation studies pending





Anemia


ferritin 208, folate 10.1, B12 338, retic count 1.1


hemoglobin normalized today, 14.9





Plan as per Dr. Perez





<Sachin Perez - Last Filed: 11/30/17 18:24>





Objective





- Vital Signs/Intake and Output


Vital Signs (last 24 hours): 


 











Temp Pulse Resp BP Pulse Ox


 


 97.5 F L  96 H  20   149/101 H  96 


 


 11/30/17 15:54  11/30/17 15:54  11/30/17 15:54  11/30/17 15:54  11/30/17 15:54








Intake and Output: 


 











 11/30/17 11/30/17





 06:59 18:59


 


Intake Total  500


 


Balance  500














- Medications


Medications: 


 Current Medications





Acetaminophen (Tylenol 325mg Tab)  650 mg PO Q6 PRN


   PRN Reason: Pain, moderate (4-7)


   Last Admin: 11/23/17 22:30 Dose:  650 mg


Albuterol/Ipratropium (Duoneb 3 Mg/0.5 Mg (3 Ml) Ud)  3 ml INH RQ6 [facility]


   Last Admin: 11/30/17 13:11 Dose:  3 ml


Amlodipine Besylate (Norvasc)  10 mg PO DAILY [facility]


Apixaban (Eliquis)  5 mg PO BID [facility]


Chlordiazepoxide (Librium)  25 mg PO Q8 PRN


   PRN Reason: withdrawal


   Last Admin: 11/30/17 08:52 Dose:  25 mg


Folic Acid (Folic Acid)  1 mg PO DAILY [facility]


   Last Admin: 11/30/17 10:59 Dose:  1 mg


Guaifenesin/Codeine Phosphate (Guaifenesin/Codeine)  10 ml PO Q6H PRN


   PRN Reason: Cough and congestion


   Last Admin: 11/30/17 06:19 Dose:  10 ml


Lisinopril (Zestril)  20 mg PO DAILY [facility]


   Last Admin: 11/30/17 11:00 Dose:  20 mg


Methylprednisolone (Solu-Medrol)  40 mg IVP Q12 [facility]


   Last Admin: 11/30/17 10:59 Dose:  40 mg


Multivitamins (Hexavitamin)  1 tab PO DAILY [facility]


   Last Admin: 11/30/17 11:00 Dose:  1 tab


Pantoprazole Sodium (Protonix Ec Tab)  40 mg PO DAILY [facility]


   Last Admin: 11/30/17 10:59 Dose:  40 mg


Thiamine HCl (Vitamin B1 Tab)  100 mg PO DAILY [facility]


   Last Admin: 11/30/17 11:00 Dose:  100 mg











- Labs


Labs: 


 





 11/30/17 08:22 





 11/30/17 08:22 





 











PT  13.0 SECONDS (9.7-12.2)  H  11/22/17  16:05    


 


INR  1.2   11/22/17  16:05    


 


APTT  158 SECONDS (21-34)  H* D 11/24/17  06:36    














Assessment and Plan





- Assessment and Plan (Free Text)


Assessment: 





Pt seen and examined, agree with residents note.

## 2017-12-01 LAB
BUN SERPL-MCNC: 30 MG/DL (ref 9–20)
CALCIUM SERPL-MCNC: 9.1 MG/DL (ref 8.6–10.4)
CHLORIDE SERPL-SCNC: 101 MMOL/L (ref 98–107)
CO2 SERPL-SCNC: 27 MMOL/L (ref 22–30)
ERYTHROCYTE [DISTWIDTH] IN BLOOD BY AUTOMATED COUNT: 13.4 % (ref 11.5–14.5)
GLUCOSE SERPL-MCNC: 151 MG/DL (ref 75–110)
HCT VFR BLD CALC: 45.9 % (ref 35–51)
MCH RBC QN AUTO: 31.7 PG (ref 27–31)
MCHC RBC AUTO-ENTMCNC: 33.2 G/DL (ref 33–37)
MCV RBC AUTO: 95.7 FL (ref 80–94)
PLATELET # BLD: 284 K/UL (ref 130–400)
PMV BLD AUTO: 9 FL (ref 7.2–11.7)
POTASSIUM SERPL-SCNC: 4.8 MMOL/L (ref 3.6–5.2)
SODIUM SERPL-SCNC: 138 MMOL/L (ref 132–148)
WBC # BLD AUTO: 9 K/UL (ref 4.8–10.8)

## 2017-12-01 RX ADMIN — PANTOPRAZOLE SODIUM SCH MG: 40 TABLET, DELAYED RELEASE ORAL at 09:51

## 2017-12-01 RX ADMIN — IPRATROPIUM BROMIDE AND ALBUTEROL SULFATE SCH ML: .5; 3 SOLUTION RESPIRATORY (INHALATION) at 13:08

## 2017-12-01 RX ADMIN — GUAIFENESIN AND CODEINE PHOSPHATE PRN ML: 100; 10 SOLUTION ORAL at 20:48

## 2017-12-01 RX ADMIN — IPRATROPIUM BROMIDE AND ALBUTEROL SULFATE SCH ML: .5; 3 SOLUTION RESPIRATORY (INHALATION) at 07:23

## 2017-12-01 RX ADMIN — GUAIFENESIN AND CODEINE PHOSPHATE PRN ML: 100; 10 SOLUTION ORAL at 10:03

## 2017-12-01 RX ADMIN — METHYLPREDNISOLONE SODIUM SUCCINATE SCH MG: 40 INJECTION, POWDER, FOR SOLUTION INTRAMUSCULAR; INTRAVENOUS at 09:50

## 2017-12-01 RX ADMIN — METHYLPREDNISOLONE SODIUM SUCCINATE SCH MG: 40 INJECTION, POWDER, FOR SOLUTION INTRAMUSCULAR; INTRAVENOUS at 21:07

## 2017-12-01 RX ADMIN — Medication SCH TAB: at 09:52

## 2017-12-01 RX ADMIN — IPRATROPIUM BROMIDE AND ALBUTEROL SULFATE SCH ML: .5; 3 SOLUTION RESPIRATORY (INHALATION) at 01:35

## 2017-12-01 NOTE — CP.PCM.PN
Subjective





- Date & Time of Evaluation


Date of Evaluation: 12/01/17


Time of Evaluation: 18:00





- Subjective


Subjective: 





No complaints at rest





Objective





- Vital Signs/Intake and Output


Vital Signs (last 24 hours): 


 











Temp Pulse Resp BP Pulse Ox


 


 97.9 F   99 H  20   152/105 H  95 


 


 12/01/17 15:34  12/01/17 15:34  12/01/17 15:34  12/01/17 15:34  12/01/17 15:34








Intake and Output: 


 











 12/01/17 12/02/17





 18:59 06:59


 


Intake Total 500 


 


Balance 500 














- Medications


Medications: 


 Current Medications





Acetaminophen (Tylenol 325mg Tab)  650 mg PO Q6 PRN


   PRN Reason: Pain, moderate (4-7)


   Last Admin: 11/23/17 22:30 Dose:  650 mg


Amlodipine Besylate (Norvasc)  10 mg PO DAILY [facility]


   Last Admin: 12/01/17 09:52 Dose:  10 mg


Apixaban (Eliquis)  5 mg PO BID [facility]


   Last Admin: 12/01/17 17:48 Dose:  5 mg


Chlordiazepoxide (Librium)  25 mg PO Q8 PRN


   PRN Reason: withdrawal


   Last Admin: 11/30/17 08:52 Dose:  25 mg


Folic Acid (Folic Acid)  1 mg PO DAILY [facility]


   Last Admin: 12/01/17 09:50 Dose:  1 mg


Guaifenesin/Codeine Phosphate (Guaifenesin/Codeine)  10 ml PO Q6H PRN


   PRN Reason: Cough and congestion


   Last Admin: 12/01/17 20:48 Dose:  10 ml


Lisinopril (Zestril)  20 mg PO DAILY [facility]


   Last Admin: 12/01/17 09:52 Dose:  20 mg


Methylprednisolone (Solu-Medrol)  40 mg IVP Q12 [facility]


   Last Admin: 12/01/17 21:07 Dose:  40 mg


Multivitamins (Hexavitamin)  1 tab PO DAILY [facility]


   Last Admin: 12/01/17 09:52 Dose:  1 tab


Pantoprazole Sodium (Protonix Ec Tab)  40 mg PO DAILY [facility]


   Last Admin: 12/01/17 09:51 Dose:  40 mg


Thiamine HCl (Vitamin B1 Tab)  100 mg PO DAILY [facility]


   Last Admin: 12/01/17 09:53 Dose:  100 mg











- Labs


Labs: 


 





 12/01/17 06:42 





 12/01/17 06:42 





 











PT  13.0 SECONDS (9.7-12.2)  H  11/22/17  16:05    


 


INR  1.2   11/22/17  16:05    


 


APTT  158 SECONDS (21-34)  H* D 11/24/17  06:36    














- Head Exam


Head Exam: ATRAUMATIC





- Eye Exam


Eye Exam: Normal appearance





- ENT Exam


ENT Exam: Mucous Membranes Dry





- Respiratory Exam


Respiratory Exam: NORMAL BREATHING PATTERN





- Cardiovascular Exam


Cardiovascular Exam: +S1, +S2





- GI/Abdominal Exam


GI & Abdominal Exam: Normal Bowel Sounds





- Extremities Exam


Extremities Exam: Normal Inspection





Assessment and Plan


(1) Pulmonary emboli


Assessment & Plan: 


unprovoked with RLE DVT


deferred IVC filter and thrombolysis


on therapetic anticoagulation


inherited thrombophilia w/u sent








Status: Acute

## 2017-12-01 NOTE — CP.PCM.PN
Subjective





- Date & Time of Evaluation


Date of Evaluation: 12/01/17


Time of Evaluation: 10:00





- Subjective


Subjective: 











Dr. Nolen note:





Patient seen and examined in room.  Patient says he is still feeling very weak 

when he walks and his balance his off when he walks as well.  Other wise no 

shortness of breath, cough, chest pain, or falls. 





Objective





- Vital Signs/Intake and Output


Vital Signs (last 24 hours): 


 











Temp Pulse Resp BP Pulse Ox


 


 97.9 F   83   20   159/90 H  95 


 


 12/01/17 08:46  12/01/17 08:46  12/01/17 08:46  12/01/17 08:46  12/01/17 08:46








Intake and Output: 


 











 12/01/17 12/01/17





 06:59 18:59


 


Intake Total 500 


 


Balance 500 














- Medications


Medications: 


 Current Medications





Acetaminophen (Tylenol 325mg Tab)  650 mg PO Q6 PRN


   PRN Reason: Pain, moderate (4-7)


   Last Admin: 11/23/17 22:30 Dose:  650 mg


Albuterol/Ipratropium (Duoneb 3 Mg/0.5 Mg (3 Ml) Ud)  3 ml INH RQ6 [facility]


   Last Admin: 12/01/17 07:23 Dose:  3 ml


Amlodipine Besylate (Norvasc)  10 mg PO DAILY [facility]


Apixaban (Eliquis)  5 mg PO BID [facility]


Chlordiazepoxide (Librium)  25 mg PO Q8 PRN


   PRN Reason: withdrawal


   Last Admin: 11/30/17 08:52 Dose:  25 mg


Folic Acid (Folic Acid)  1 mg PO DAILY [facility]


   Last Admin: 11/30/17 10:59 Dose:  1 mg


Guaifenesin/Codeine Phosphate (Guaifenesin/Codeine)  10 ml PO Q6H PRN


   PRN Reason: Cough and congestion


   Last Admin: 11/30/17 22:06 Dose:  10 ml


Lisinopril (Zestril)  20 mg PO DAILY [facility]


   Last Admin: 11/30/17 11:00 Dose:  20 mg


Methylprednisolone (Solu-Medrol)  40 mg IVP Q12 [facility]


   Last Admin: 11/30/17 21:53 Dose:  40 mg


Multivitamins (Hexavitamin)  1 tab PO DAILY [facility]


   Last Admin: 11/30/17 11:00 Dose:  1 tab


Pantoprazole Sodium (Protonix Ec Tab)  40 mg PO DAILY [facility]


   Last Admin: 11/30/17 10:59 Dose:  40 mg


Thiamine HCl (Vitamin B1 Tab)  100 mg PO DAILY [facility]


   Last Admin: 11/30/17 11:00 Dose:  100 mg











- Labs


Labs: 


 





 12/01/17 06:42 





 12/01/17 06:42 





 











PT  13.0 SECONDS (9.7-12.2)  H  11/22/17  16:05    


 


INR  1.2   11/22/17  16:05    


 


APTT  158 SECONDS (21-34)  H* D 11/24/17  06:36    














- Constitutional


Appears: Non-toxic, No Acute Distress





- Eye Exam


Eye Exam: Normal appearance





- Respiratory Exam


Respiratory Exam: Clear to Ausculation Bilateral.  absent: Rales, Rhonchi, 

Wheezes





- Cardiovascular Exam


Cardiovascular Exam: REGULAR RHYTHM, RRR, +S1, +S2.  absent: Gallop, Rubs





- GI/Abdominal Exam


GI & Abdominal Exam: Soft, Normal Bowel Sounds.  absent: Tenderness





- Extremities Exam


Extremities Exam: Normal Inspection.  absent: Pedal Edema





- Back Exam


Back Exam: NORMAL INSPECTION





- Psychiatric Exam


Psychiatric exam: Normal Affect, Normal Mood





- Skin


Skin Exam: Dry, Normal Color





Assessment and Plan





- Assessment and Plan (Free Text)


Assessment: 





Submassive PE; improving


12/1:  Pending placement to TCU when his insurance will authorize per case 

mangement, on Eliquis 5mg bid. 


patient will be started on Eliquis 10mg BID today; will need for one week and 

then 5mg BID on 12/1/17


-patient will need CT w/ contrast at some point in the future as per 

pulmonology for PE monitoring when Cr comes down


-troponin leak most likely 2/2 to RV strain from PE


-repeat CXR showed small pleural effusion L>R


-Solumedrol 40mg IV q12H


-Heme Dr. Perez consulted, help appreciated


-F/U hypercoag workup 





Elevated BP


-Lisinopril 20mg PO daily





Elevated blood sugars


-A1c 5.7





EtOH use


-[facility] protocol


-Librium PRN


-Folic Acid/Thiamine daily 





Prophylaxis


-Eliquis


-Protonix


-PT/OT, placement pending





disposition:


Pending placement

## 2017-12-02 LAB
B2 GLYCOPROT1 IGA SER-ACNC: <9 SAU (ref ?–20)
B2 GLYCOPROT1 IGG SER-ACNC: <9 SGU (ref ?–20)
B2 GLYCOPROT1 IGM SER-ACNC: <9 SMU (ref ?–20)
CARDIOLIPIN IGA SER IA-ACNC: <11 APL (ref ?–11)
PS IGA SER-ACNC: <20 U/ML (ref ?–20)
PS IGM SER-ACNC: <25 U/ML (ref ?–25)

## 2017-12-02 RX ADMIN — Medication SCH TAB: at 09:06

## 2017-12-02 RX ADMIN — GUAIFENESIN AND CODEINE PHOSPHATE PRN ML: 100; 10 SOLUTION ORAL at 22:09

## 2017-12-02 RX ADMIN — METHYLPREDNISOLONE SODIUM SUCCINATE SCH MG: 40 INJECTION, POWDER, FOR SOLUTION INTRAMUSCULAR; INTRAVENOUS at 22:06

## 2017-12-02 RX ADMIN — METHYLPREDNISOLONE SODIUM SUCCINATE SCH MG: 40 INJECTION, POWDER, FOR SOLUTION INTRAMUSCULAR; INTRAVENOUS at 09:06

## 2017-12-02 RX ADMIN — PANTOPRAZOLE SODIUM SCH MG: 40 TABLET, DELAYED RELEASE ORAL at 09:06

## 2017-12-02 NOTE — CP.PCM.PN
Subjective





- Date & Time of Evaluation


Date of Evaluation: 12/02/17


Time of Evaluation: 16:25





- Subjective


Subjective: 





Feeling better.





Objective





- Vital Signs/Intake and Output


Vital Signs (last 24 hours): 


 











Temp Pulse Resp BP Pulse Ox


 


 98.2 F   87   20   164/106 H  96 


 


 12/02/17 07:50  12/02/17 07:50  12/02/17 07:50  12/02/17 11:25  12/02/17 07:50








Intake and Output: 


 











 12/02/17 12/02/17





 06:59 18:59


 


Intake Total  500


 


Balance  500














- Medications


Medications: 


 Current Medications





Acetaminophen (Tylenol 325mg Tab)  650 mg PO Q6 PRN


   PRN Reason: Pain, moderate (4-7)


   Last Admin: 11/23/17 22:30 Dose:  650 mg


Amlodipine Besylate (Norvasc)  10 mg PO DAILY [facility]


   Last Admin: 12/02/17 09:10 Dose:  Not Given


Apixaban (Eliquis)  5 mg PO BID [facility]


   Last Admin: 12/02/17 17:28 Dose:  5 mg


Chlordiazepoxide (Librium)  25 mg PO Q8 PRN


   PRN Reason: withdrawal


   Last Admin: 11/30/17 08:52 Dose:  25 mg


Folic Acid (Folic Acid)  1 mg PO DAILY [facility]


   Last Admin: 12/02/17 09:06 Dose:  1 mg


Guaifenesin/Codeine Phosphate (Guaifenesin/Codeine)  10 ml PO Q6H PRN


   PRN Reason: Cough and congestion


   Last Admin: 12/01/17 20:48 Dose:  10 ml


Lisinopril (Zestril)  20 mg PO DAILY [facility]


   Last Admin: 12/02/17 09:10 Dose:  Not Given


Methylprednisolone (Solu-Medrol)  40 mg IVP Q12 [facility]


   Last Admin: 12/02/17 09:06 Dose:  40 mg


Multivitamins (Hexavitamin)  1 tab PO DAILY [facility]


   Last Admin: 12/02/17 09:06 Dose:  1 tab


Pantoprazole Sodium (Protonix Ec Tab)  40 mg PO DAILY [facility]


   Last Admin: 12/02/17 09:06 Dose:  40 mg


Thiamine HCl (Vitamin B1 Tab)  100 mg PO DAILY [facility]


   Last Admin: 12/02/17 09:06 Dose:  100 mg











- Labs


Labs: 


 





 12/01/17 06:42 





 12/01/17 06:42 





 











PT  13.0 SECONDS (9.7-12.2)  H  11/22/17  16:05    


 


INR  1.2   11/22/17  16:05    


 


APTT  158 SECONDS (21-34)  H* D 11/24/17  06:36    














- Head Exam


Head Exam: ATRAUMATIC





- Eye Exam


Eye Exam: Normal appearance





- ENT Exam


ENT Exam: Mucous Membranes Dry





- Respiratory Exam


Respiratory Exam: NORMAL BREATHING PATTERN





- Cardiovascular Exam


Cardiovascular Exam: +S1, +S2





- GI/Abdominal Exam


GI & Abdominal Exam: Normal Bowel Sounds





- Extremities Exam


Extremities Exam: Normal Inspection





Assessment and Plan


(1) Pulmonary emboli


Assessment & Plan: 


with DVT


deferred IVC filter and catheter directed thrombolysis


on anticoagulation


clinically improving


thrombophilia w/u in process


Status: Acute

## 2017-12-03 RX ADMIN — GUAIFENESIN AND CODEINE PHOSPHATE PRN ML: 100; 10 SOLUTION ORAL at 22:07

## 2017-12-03 RX ADMIN — PANTOPRAZOLE SODIUM SCH MG: 40 TABLET, DELAYED RELEASE ORAL at 10:13

## 2017-12-03 RX ADMIN — METHYLPREDNISOLONE SODIUM SUCCINATE SCH MG: 40 INJECTION, POWDER, FOR SOLUTION INTRAMUSCULAR; INTRAVENOUS at 10:13

## 2017-12-03 RX ADMIN — METHYLPREDNISOLONE SODIUM SUCCINATE SCH MG: 40 INJECTION, POWDER, FOR SOLUTION INTRAMUSCULAR; INTRAVENOUS at 22:07

## 2017-12-03 RX ADMIN — Medication SCH TAB: at 10:14

## 2017-12-03 NOTE — CP.PCM.PN
Subjective





- Date & Time of Evaluation


Date of Evaluation: 11/29/17


Time of Evaluation: 08:50





- Subjective


Subjective: 





Patient seen and evaluated


Denies chest pain and dyspnea





Objective





- Vital Signs/Intake and Output


Vital Signs (last 24 hours): 


 











Temp Pulse Resp BP Pulse Ox


 


 98.2 F   82   20   148/95 H  96 


 


 12/03/17 15:59  12/03/17 22:13  12/03/17 15:59  12/03/17 22:13  12/03/17 15:59








Intake and Output: 


 











 12/03/17 12/04/17





 18:59 06:59


 


Intake Total 500 


 


Balance 500 














- Medications


Medications: 


 Current Medications





Acetaminophen (Tylenol 325mg Tab)  650 mg PO Q6 PRN


   PRN Reason: Pain, moderate (4-7)


   Last Admin: 11/23/17 22:30 Dose:  650 mg


Amlodipine Besylate (Norvasc)  10 mg PO DAILY [facility]


   Last Admin: 12/03/17 10:14 Dose:  10 mg


Apixaban (Eliquis)  5 mg PO BID [facility]


   Last Admin: 12/03/17 18:19 Dose:  5 mg


Chlordiazepoxide (Librium)  25 mg PO Q8 PRN


   PRN Reason: withdrawal


   Last Admin: 11/30/17 08:52 Dose:  25 mg


Folic Acid (Folic Acid)  1 mg PO DAILY [facility]


   Last Admin: 12/03/17 10:14 Dose:  1 mg


Guaifenesin/Codeine Phosphate (Guaifenesin/Codeine)  10 ml PO Q6H PRN


   PRN Reason: Cough and congestion


   Last Admin: 12/03/17 22:07 Dose:  10 ml


Lisinopril (Zestril)  40 mg PO DAILY [facility]


Methylprednisolone (Solu-Medrol)  40 mg IVP Q12 [facility]


   Last Admin: 12/03/17 22:07 Dose:  40 mg


Multivitamins (Hexavitamin)  1 tab PO DAILY [facility]


   Last Admin: 12/03/17 10:14 Dose:  1 tab


Pantoprazole Sodium (Protonix Ec Tab)  40 mg PO DAILY [facility]


   Last Admin: 12/03/17 10:13 Dose:  40 mg


Thiamine HCl (Vitamin B1 Tab)  100 mg PO DAILY [facility]


   Last Admin: 12/03/17 10:16 Dose:  100 mg











- Labs


Labs: 


 





 12/01/17 06:42 





 12/01/17 06:42 





 











PT  13.0 SECONDS (9.7-12.2)  H  11/22/17  16:05    


 


INR  1.2   11/22/17  16:05    


 


APTT  158 SECONDS (21-34)  H* D 11/24/17  06:36

## 2017-12-03 NOTE — CP.PCM.PN
Subjective





- Date & Time of Evaluation


Date of Evaluation: 12/27/17


Time of Evaluation: 08:00





- Subjective


Subjective: 





Patient seen and evaluated


No acute events noted 





Objective





- Vital Signs/Intake and Output


Vital Signs (last 24 hours): 


 











Temp Pulse Resp BP Pulse Ox


 


 98.2 F   82   20   148/95 H  96 


 


 12/03/17 15:59  12/03/17 22:13  12/03/17 15:59  12/03/17 22:13  12/03/17 15:59








Intake and Output: 


 











 12/03/17 12/04/17





 18:59 06:59


 


Intake Total 500 


 


Balance 500 














- Medications


Medications: 


 Current Medications





Acetaminophen (Tylenol 325mg Tab)  650 mg PO Q6 PRN


   PRN Reason: Pain, moderate (4-7)


   Last Admin: 11/23/17 22:30 Dose:  650 mg


Amlodipine Besylate (Norvasc)  10 mg PO DAILY [facility]


   Last Admin: 12/03/17 10:14 Dose:  10 mg


Apixaban (Eliquis)  5 mg PO BID [facility]


   Last Admin: 12/03/17 18:19 Dose:  5 mg


Chlordiazepoxide (Librium)  25 mg PO Q8 PRN


   PRN Reason: withdrawal


   Last Admin: 11/30/17 08:52 Dose:  25 mg


Folic Acid (Folic Acid)  1 mg PO DAILY [facility]


   Last Admin: 12/03/17 10:14 Dose:  1 mg


Guaifenesin/Codeine Phosphate (Guaifenesin/Codeine)  10 ml PO Q6H PRN


   PRN Reason: Cough and congestion


   Last Admin: 12/03/17 22:07 Dose:  10 ml


Lisinopril (Zestril)  40 mg PO DAILY [facility]


Methylprednisolone (Solu-Medrol)  40 mg IVP Q12 [facility]


   Last Admin: 12/03/17 22:07 Dose:  40 mg


Multivitamins (Hexavitamin)  1 tab PO DAILY [facility]


   Last Admin: 12/03/17 10:14 Dose:  1 tab


Pantoprazole Sodium (Protonix Ec Tab)  40 mg PO DAILY [facility]


   Last Admin: 12/03/17 10:13 Dose:  40 mg


Thiamine HCl (Vitamin B1 Tab)  100 mg PO DAILY [facility]


   Last Admin: 12/03/17 10:16 Dose:  100 mg











- Labs


Labs: 


 





 12/01/17 06:42 





 12/01/17 06:42 





 











PT  13.0 SECONDS (9.7-12.2)  H  11/22/17  16:05    


 


INR  1.2   11/22/17  16:05    


 


APTT  158 SECONDS (21-34)  H* D 11/24/17  06:36

## 2017-12-03 NOTE — CP.PCM.PN
Subjective





- Date & Time of Evaluation


Date of Evaluation: 11/30/17


Time of Evaluation: 09:20





- Subjective


Subjective: 





Patient seen and evaluated


No cardiac events noted





Objective





- Vital Signs/Intake and Output


Vital Signs (last 24 hours): 


 











Temp Pulse Resp BP Pulse Ox


 


 98.2 F   82   20   148/95 H  96 


 


 12/03/17 15:59  12/03/17 22:13  12/03/17 15:59  12/03/17 22:13  12/03/17 15:59








Intake and Output: 


 











 12/03/17 12/04/17





 18:59 06:59


 


Intake Total 500 


 


Balance 500 














- Medications


Medications: 


 Current Medications





Acetaminophen (Tylenol 325mg Tab)  650 mg PO Q6 PRN


   PRN Reason: Pain, moderate (4-7)


   Last Admin: 11/23/17 22:30 Dose:  650 mg


Amlodipine Besylate (Norvasc)  10 mg PO DAILY [facility]


   Last Admin: 12/03/17 10:14 Dose:  10 mg


Apixaban (Eliquis)  5 mg PO BID [facility]


   Last Admin: 12/03/17 18:19 Dose:  5 mg


Chlordiazepoxide (Librium)  25 mg PO Q8 PRN


   PRN Reason: withdrawal


   Last Admin: 11/30/17 08:52 Dose:  25 mg


Folic Acid (Folic Acid)  1 mg PO DAILY [facility]


   Last Admin: 12/03/17 10:14 Dose:  1 mg


Guaifenesin/Codeine Phosphate (Guaifenesin/Codeine)  10 ml PO Q6H PRN


   PRN Reason: Cough and congestion


   Last Admin: 12/03/17 22:07 Dose:  10 ml


Lisinopril (Zestril)  40 mg PO DAILY [facility]


Methylprednisolone (Solu-Medrol)  40 mg IVP Q12 [facility]


   Last Admin: 12/03/17 22:07 Dose:  40 mg


Multivitamins (Hexavitamin)  1 tab PO DAILY [facility]


   Last Admin: 12/03/17 10:14 Dose:  1 tab


Pantoprazole Sodium (Protonix Ec Tab)  40 mg PO DAILY [facility]


   Last Admin: 12/03/17 10:13 Dose:  40 mg


Thiamine HCl (Vitamin B1 Tab)  100 mg PO DAILY [facility]


   Last Admin: 12/03/17 10:16 Dose:  100 mg











- Labs


Labs: 


 





 12/01/17 06:42 





 12/01/17 06:42 





 











PT  13.0 SECONDS (9.7-12.2)  H  11/22/17  16:05    


 


INR  1.2   11/22/17  16:05    


 


APTT  158 SECONDS (21-34)  H* D 11/24/17  06:36

## 2017-12-03 NOTE — CP.PCM.PN
Subjective





- Date & Time of Evaluation


Date of Evaluation: 12/02/17


Time of Evaluation: 10:15





- Subjective


Subjective: 





Patient seen and evaluated


Denies chest pain and dyspnea





Objective





- Vital Signs/Intake and Output


Vital Signs (last 24 hours): 


 











Temp Pulse Resp BP Pulse Ox


 


 98.2 F   82   20   148/95 H  96 


 


 12/03/17 15:59  12/03/17 22:13  12/03/17 15:59  12/03/17 22:13  12/03/17 15:59








Intake and Output: 


 











 12/03/17 12/04/17





 18:59 06:59


 


Intake Total 500 


 


Balance 500 














- Medications


Medications: 


 Current Medications





Acetaminophen (Tylenol 325mg Tab)  650 mg PO Q6 PRN


   PRN Reason: Pain, moderate (4-7)


   Last Admin: 11/23/17 22:30 Dose:  650 mg


Amlodipine Besylate (Norvasc)  10 mg PO DAILY [facility]


   Last Admin: 12/03/17 10:14 Dose:  10 mg


Apixaban (Eliquis)  5 mg PO BID [facility]


   Last Admin: 12/03/17 18:19 Dose:  5 mg


Chlordiazepoxide (Librium)  25 mg PO Q8 PRN


   PRN Reason: withdrawal


   Last Admin: 11/30/17 08:52 Dose:  25 mg


Folic Acid (Folic Acid)  1 mg PO DAILY [facility]


   Last Admin: 12/03/17 10:14 Dose:  1 mg


Guaifenesin/Codeine Phosphate (Guaifenesin/Codeine)  10 ml PO Q6H PRN


   PRN Reason: Cough and congestion


   Last Admin: 12/03/17 22:07 Dose:  10 ml


Lisinopril (Zestril)  40 mg PO DAILY [facility]


Methylprednisolone (Solu-Medrol)  40 mg IVP Q12 [facility]


   Last Admin: 12/03/17 22:07 Dose:  40 mg


Multivitamins (Hexavitamin)  1 tab PO DAILY [facility]


   Last Admin: 12/03/17 10:14 Dose:  1 tab


Pantoprazole Sodium (Protonix Ec Tab)  40 mg PO DAILY [facility]


   Last Admin: 12/03/17 10:13 Dose:  40 mg


Thiamine HCl (Vitamin B1 Tab)  100 mg PO DAILY [facility]


   Last Admin: 12/03/17 10:16 Dose:  100 mg











- Labs


Labs: 


 





 12/01/17 06:42 





 12/01/17 06:42 





 











PT  13.0 SECONDS (9.7-12.2)  H  11/22/17  16:05    


 


INR  1.2   11/22/17  16:05    


 


APTT  158 SECONDS (21-34)  H* D 11/24/17  06:36

## 2017-12-03 NOTE — CP.PCM.PN
Subjective





- Date & Time of Evaluation


Date of Evaluation: 12/01/17


Time of Evaluation: 08:30





- Subjective


Subjective: 





Patient seen and evaluated


Some cough. No chest pain





Objective





- Vital Signs/Intake and Output


Vital Signs (last 24 hours): 


 











Temp Pulse Resp BP Pulse Ox


 


 98.2 F   82   20   148/95 H  96 


 


 12/03/17 15:59  12/03/17 22:13  12/03/17 15:59  12/03/17 22:13  12/03/17 15:59








Intake and Output: 


 











 12/03/17 12/04/17





 18:59 06:59


 


Intake Total 500 


 


Balance 500 














- Medications


Medications: 


 Current Medications





Acetaminophen (Tylenol 325mg Tab)  650 mg PO Q6 PRN


   PRN Reason: Pain, moderate (4-7)


   Last Admin: 11/23/17 22:30 Dose:  650 mg


Amlodipine Besylate (Norvasc)  10 mg PO DAILY [facility]


   Last Admin: 12/03/17 10:14 Dose:  10 mg


Apixaban (Eliquis)  5 mg PO BID [facility]


   Last Admin: 12/03/17 18:19 Dose:  5 mg


Chlordiazepoxide (Librium)  25 mg PO Q8 PRN


   PRN Reason: withdrawal


   Last Admin: 11/30/17 08:52 Dose:  25 mg


Folic Acid (Folic Acid)  1 mg PO DAILY [facility]


   Last Admin: 12/03/17 10:14 Dose:  1 mg


Guaifenesin/Codeine Phosphate (Guaifenesin/Codeine)  10 ml PO Q6H PRN


   PRN Reason: Cough and congestion


   Last Admin: 12/03/17 22:07 Dose:  10 ml


Lisinopril (Zestril)  40 mg PO DAILY [facility]


Methylprednisolone (Solu-Medrol)  40 mg IVP Q12 [facility]


   Last Admin: 12/03/17 22:07 Dose:  40 mg


Multivitamins (Hexavitamin)  1 tab PO DAILY [facility]


   Last Admin: 12/03/17 10:14 Dose:  1 tab


Pantoprazole Sodium (Protonix Ec Tab)  40 mg PO DAILY [facility]


   Last Admin: 12/03/17 10:13 Dose:  40 mg


Thiamine HCl (Vitamin B1 Tab)  100 mg PO DAILY [facility]


   Last Admin: 12/03/17 10:16 Dose:  100 mg











- Labs


Labs: 


 





 12/01/17 06:42 





 12/01/17 06:42 





 











PT  13.0 SECONDS (9.7-12.2)  H  11/22/17  16:05    


 


INR  1.2   11/22/17  16:05    


 


APTT  158 SECONDS (21-34)  H* D 11/24/17  06:36

## 2017-12-03 NOTE — CP.PCM.PN
Subjective





- Date & Time of Evaluation


Date of Evaluation: 12/03/17


Time of Evaluation: 13:00





- Subjective


Subjective: 





Patient seen and evaluated


denies chest pain and dyspnea


PE on anticoagulation


Awaiting rehab placement





Objective





- Vital Signs/Intake and Output


Vital Signs (last 24 hours): 


 











Temp Pulse Resp BP Pulse Ox


 


 98.2 F   89   20   162/106 H  96 


 


 12/03/17 15:59  12/03/17 15:59  12/03/17 15:59  12/03/17 15:59  12/03/17 15:59








Intake and Output: 


 











 12/03/17 12/04/17





 18:59 06:59


 


Intake Total 500 


 


Balance 500 














- Medications


Medications: 


 Current Medications





Acetaminophen (Tylenol 325mg Tab)  650 mg PO Q6 PRN


   PRN Reason: Pain, moderate (4-7)


   Last Admin: 11/23/17 22:30 Dose:  650 mg


Amlodipine Besylate (Norvasc)  10 mg PO DAILY [facility]


   Last Admin: 12/03/17 10:14 Dose:  10 mg


Apixaban (Eliquis)  5 mg PO BID [facility]


   Last Admin: 12/03/17 18:19 Dose:  5 mg


Chlordiazepoxide (Librium)  25 mg PO Q8 PRN


   PRN Reason: withdrawal


   Last Admin: 11/30/17 08:52 Dose:  25 mg


Folic Acid (Folic Acid)  1 mg PO DAILY [facility]


   Last Admin: 12/03/17 10:14 Dose:  1 mg


Guaifenesin/Codeine Phosphate (Guaifenesin/Codeine)  10 ml PO Q6H PRN


   PRN Reason: Cough and congestion


   Last Admin: 12/02/17 22:09 Dose:  10 ml


Lisinopril (Zestril)  40 mg PO DAILY [facility]


Methylprednisolone (Solu-Medrol)  40 mg IVP Q12 [facility]


   Last Admin: 12/03/17 10:13 Dose:  40 mg


Multivitamins (Hexavitamin)  1 tab PO DAILY [facility]


   Last Admin: 12/03/17 10:14 Dose:  1 tab


Pantoprazole Sodium (Protonix Ec Tab)  40 mg PO DAILY [facility]


   Last Admin: 12/03/17 10:13 Dose:  40 mg


Thiamine HCl (Vitamin B1 Tab)  100 mg PO DAILY [facility]


   Last Admin: 12/03/17 10:16 Dose:  100 mg











- Labs


Labs: 


 





 12/01/17 06:42 





 12/01/17 06:42 





 











PT  13.0 SECONDS (9.7-12.2)  H  11/22/17  16:05    


 


INR  1.2   11/22/17  16:05    


 


APTT  158 SECONDS (21-34)  H* D 11/24/17  06:36

## 2017-12-04 VITALS — HEART RATE: 87 BPM | SYSTOLIC BLOOD PRESSURE: 137 MMHG | TEMPERATURE: 98.1 F | DIASTOLIC BLOOD PRESSURE: 94 MMHG

## 2017-12-04 VITALS — OXYGEN SATURATION: 95 %

## 2017-12-04 RX ADMIN — METHYLPREDNISOLONE SODIUM SUCCINATE SCH MG: 40 INJECTION, POWDER, FOR SOLUTION INTRAMUSCULAR; INTRAVENOUS at 10:45

## 2017-12-04 RX ADMIN — GUAIFENESIN AND CODEINE PHOSPHATE PRN ML: 100; 10 SOLUTION ORAL at 08:15

## 2017-12-04 RX ADMIN — Medication SCH TAB: at 10:44

## 2017-12-04 RX ADMIN — PANTOPRAZOLE SODIUM SCH MG: 40 TABLET, DELAYED RELEASE ORAL at 10:59

## 2017-12-04 NOTE — CP.PCM.PN
Subjective





- Date & Time of Evaluation


Date of Evaluation: 12/04/17


Time of Evaluation: 09:47





- Subjective


Subjective: 


PGY2 Note for Dr. Nolen; all management as per Dr. Nolen 





Patient seen and examined at bedside this AM; denies any complaints and states 

that SOB is much improved compared to admission; states retired recently and 

went from being extremely active to laying in bed 23 horus a day (as per patient

) and the most activity he would have is a 10min walk to get his newspaper.


The patient is stable for d/c to [facility] pending approval from insurance. 





Objective





- Vital Signs/Intake and Output


Vital Signs (last 24 hours): 


 











Temp Pulse Resp BP Pulse Ox


 


 97.5 F L  60   20   127/82   95 


 


 12/04/17 07:54  12/04/17 08:14  12/04/17 07:54  12/04/17 08:14  12/04/17 07:54








Intake and Output: 


 











 12/04/17 12/04/17





 06:59 18:59


 


Output Total 700 


 


Balance -700 














- Medications


Medications: 


 Current Medications





Acetaminophen (Tylenol 325mg Tab)  650 mg PO Q6 PRN


   PRN Reason: Pain, moderate (4-7)


   Last Admin: 11/23/17 22:30 Dose:  650 mg


Amlodipine Besylate (Norvasc)  10 mg PO DAILY [facility]


   Last Admin: 12/03/17 10:14 Dose:  10 mg


Apixaban (Eliquis)  5 mg PO BID [facility]


   Last Admin: 12/03/17 18:19 Dose:  5 mg


Chlordiazepoxide (Librium)  25 mg PO Q8 PRN


   PRN Reason: withdrawal


   Last Admin: 11/30/17 08:52 Dose:  25 mg


Folic Acid (Folic Acid)  1 mg PO DAILY [facility]


   Last Admin: 12/03/17 10:14 Dose:  1 mg


Guaifenesin/Codeine Phosphate (Guaifenesin/Codeine)  10 ml PO Q6H PRN


   PRN Reason: Cough and congestion


   Last Admin: 12/04/17 08:15 Dose:  10 ml


Lisinopril (Zestril)  40 mg PO DAILY [facility]


Methylprednisolone (Solu-Medrol)  40 mg IVP Q12 [facility]


   Last Admin: 12/03/17 22:07 Dose:  40 mg


Multivitamins (Hexavitamin)  1 tab PO DAILY [facility]


   Last Admin: 12/03/17 10:14 Dose:  1 tab


Pantoprazole Sodium (Protonix Ec Tab)  40 mg PO DAILY [facility]


   Last Admin: 12/03/17 10:13 Dose:  40 mg


Thiamine HCl (Vitamin B1 Tab)  100 mg PO DAILY [facility]


   Last Admin: 12/03/17 10:16 Dose:  100 mg











- Labs


Labs: 


 





 12/01/17 06:42 





 12/01/17 06:42 





 











PT  13.0 SECONDS (9.7-12.2)  H  11/22/17  16:05    


 


INR  1.2   11/22/17  16:05    


 


APTT  158 SECONDS (21-34)  H* D 11/24/17  06:36    














- Constitutional


Appears: Non-toxic





- Head Exam


Head Exam: ATRAUMATIC





- Eye Exam


Eye Exam: EOMI





- ENT Exam


ENT Exam: Mucous Membranes Moist





- Neck Exam


Neck Exam: Full ROM





- Respiratory Exam


Respiratory Exam: Clear to Ausculation Bilateral





- Cardiovascular Exam


Cardiovascular Exam: REGULAR RHYTHM





- GI/Abdominal Exam


GI & Abdominal Exam: Soft, Normal Bowel Sounds





- Extremities Exam


Extremities Exam: absent: Calf Tenderness





- Back Exam


Back Exam: absent: CVA tenderness (L), CVA tenderness (R)





- Neurological Exam


Neurological Exam: Alert, Awake, Oriented x3





- Skin


Skin Exam: Warm





Assessment and Plan





- Assessment and Plan (Free Text)


Assessment: 





Submassive PE; improving


12/4; patient was accepted to [facility]; pending approval from insurance as 

likely over the weekend no one was working; patient is stable for d/c once auth 

is given


12/1:  Pending placement to [facility] when his insurance will authorize per case 

mangement, on Eliquis 5mg bid. 


patient will be started on Eliquis 10mg BID today; will need for one week and 

then 5mg BID on 12/1/17


-patient will need CT w/ contrast at some point in the future as per 

pulmonology for PE monitoring when Cr comes down


-troponin leak most likely 2/2 to RV strain from PE


-repeat CXR showed small pleural effusion L>R


-Solumedrol 40mg IV q12H


-Heme Dr. Perez consulted, help appreciated


-F/U hypercoag workup 





Elevated BP


-Lisinopril 20mg PO daily





Elevated blood sugars


-A1c 5.7





EtOH use


-[facility] protocol


-Librium PRN


-Folic Acid/Thiamine daily 





Prophylaxis


-Eliquis


-Protonix


-PT/OT, placement pending





disposition:


Pending placement; patient is stable for d/c once auth is given


c/w lisinopril 20mg


c/w eliquis 5mg PO BID for 3 months; will need to f/u w/ pulm for repeat CT


patient is going to [facility] as per case management 





All management as per Dr. Nolen

## 2017-12-05 NOTE — CP.CCUPN
<Jermaine Tompkins - Last Filed: 11/24/17 14:14>





CCU Subjective





- Physician Review


Subjective (Free Text): 





PGY1 ICU progress note for Dr. Winn





Patient seen and examined overnight. No acute events overnight. Patient states 

he is feeling well. His only complaint is a right sided chest pain associated 

with deep inspiration. 





CCU Objective





- Vital Signs / Intake & Output


Vital Signs (Last 4 hours): 


Vital Signs











  Pulse Resp BP Pulse Ox


 


 11/24/17 12:00  107 H  16  139/99 H  91 L


 


 11/24/17 11:07  94 H  18  130/103 H 


 


 11/24/17 11:02  95 H   


 


 11/24/17 10:00  89   152/104 H  94 L











Intake and Output (Last 8hrs): 


 Intake & Output











 11/23/17 11/24/17 11/24/17





 22:59 06:59 14:59


 


Intake Total 1435.2 1085.2 99.4


 


Output Total 1600 900 


 


Balance -164.8 185.2 99.4


 


Intake:   


 


  IV  250 10


 


  Intake, IV Amount 715.2 715.2 89.4


 


    Left Antecubital 375  


 


    Right Antecubital 115.2 115.2 14.4


 


    Right Distal Port 225 600 75





    Antecubital   


 


  Oral 720 120 


 


Output:   


 


  Urine 1600 900 


 


    Urine, Voided 1600 900 


 


Other:   


 


  # Voids   


 


    Urine, Voided 1 1 














- Physical Exam


Head: Positive for: Atraumatic, Normocephalic


Extroacular Muscles: Positive for: EOMI


Mouth: Positive for: Moist Mucous Membranes


Neck: Positive for: Normal Range of Motion


Respiratory/Chest: Positive for: Clear to Auscultation, Good Air Exchange.  

Negative for: Respiratory Distress


Cardiovascular: Positive for: Regular Rate and Rhythm, Normal S1, S2


Lower Extremity: Positive for: Normal Inspection


Neurological: Positive for: GCS=15, Speech Normal


Skin: Positive for: Warm, Dry


Psychiatric: Positive for: Alert, Oriented x 3





- Medications


Active Medications: 


Active Medications











Generic Name Dose Route Start Last Admin





  Trade Name Freq  PRN Reason Stop Dose Admin


 


Acetaminophen  650 mg  11/23/17 09:28  11/23/17 22:30





  Tylenol 325mg Tab  PO   650 mg





  Q6 PRN   Administration





  Pain, moderate (4-7)   


 


Apixaban  10 mg  11/24/17 10:00  11/24/17 09:55





  Eliquis  PO  11/30/17 18:01  10 mg





  BID ITA   Administration


 


Apixaban  5 mg  12/01/17 10:00  





  Eliquis  PO   





  BID ITA   


 


Chlordiazepoxide  25 mg  11/22/17 22:00  11/24/17 06:20





  Librium  PO   25 mg





  Q8 ITA   Administration


 


Folic Acid  1 mg  11/23/17 10:00  11/24/17 09:13





  Folic Acid  PO   1 mg





  DAILY ITA   Administration


 


Sodium Chloride  1,000 mls @ 75 mls/hr  11/23/17 09:00  11/23/17 22:35





  Sodium Chloride 0.9%  IV   75 mls/hr





  .[id number] ITA   Administration


 


Multivitamins  1 tab  11/23/17 10:00  11/24/17 09:13





  Hexavitamin  PO   1 tab





  DAILY ITA   Administration


 


Pantoprazole Sodium  40 mg  11/23/17 10:00  11/24/17 09:14





  Protonix Ec Tab  PO   40 mg





  DAILY ITA   Administration


 


Thiamine HCl  100 mg  11/22/17 21:10  11/24/17 09:14





  Vitamin B1 Tab  PO   100 mg





  DAILY ITA   Administration














- Patient Studies


Lab Studies: 


 Microbiology Studies











 11/22/17 22:47 MRSA Culture (Admit) - Final





 [facility]    MRSA NOT DETECTED








 Lab Studies











  11/24/17 11/24/17 11/24/17 Range/Units





  09:45 06:36 06:36 


 


WBC     (4.8-10.8)  K/uL


 


RBC     (4.40-5.90)  Mil/uL


 


Hgb     (12.0-18.0)  g/dL


 


Hct     (35.0-51.0)  %


 


MCV     (80.0-94.0)  fL


 


MCH     (27.0-31.0)  pg


 


MCHC     (33.0-37.0)  g/dL


 


RDW     (11.5-14.5)  %


 


Plt Count     (130-400)  K/uL


 


MPV     (7.2-11.7)  fL


 


Neut % (Auto)     (50.0-75.0)  %


 


Lymph % (Auto)     (20.0-40.0)  %


 


Mono % (Auto)     (0.0-10.0)  %


 


Eos % (Auto)     (0.0-4.0)  %


 


Baso % (Auto)     (0.0-2.0)  %


 


Neut #     (1.8-7.0)  K/uL


 


Lymph #     (1.0-4.3)  K/uL


 


Mono #     (0.0-0.8)  K/uL


 


Eos #     (0.0-0.7)  K/uL


 


Baso #     (0.0-0.2)  K/uL


 


APTT    158 H* D  (21-34)  SECONDS


 


Sodium   137   (132-148)  mmol/L


 


Potassium   3.9   (3.6-5.2)  mmol/L


 


Chloride   104   ()  mmol/L


 


Carbon Dioxide   25   (22-30)  mmol/L


 


Anion Gap   12   (10-20)  


 


BUN   24 H   (9-20)  mg/dL


 


Creatinine   1.7 H   (0.8-1.5)  mg/dL


 


Est GFR ( Amer)   49   


 


Est GFR (Non-Af Amer)   41   


 


Random Glucose   105   ()  mg/dL


 


Hemoglobin A1c  5.7    (4.2-6.5)  %


 


Calcium   8.0 L   (8.6-10.4)  mg/dl


 


Phosphorus   4.1   (2.5-4.5)  mg/dL


 


Magnesium   1.3 L   (1.6-2.3)  mg/dL


 


Total Bilirubin   0.3   (0.2-1.3)  mg/dL


 


AST   19   (17-59)  U/L


 


ALT   54   (21-72)  U/L


 


Alkaline Phosphatase   57   ()  U/L


 


Total Creatine Kinase   32 L   ()  U/L


 


CK-MB (Mass)   2.01   (0.0-3.38)  ng/mL


 


Troponin I   0.1060   (0.00-0.120)  ng/mL


 


Total Protein   6.5   (6.3-8.3)  g/dL


 


Albumin   3.2 L   (3.5-5.0)  g/dL


 


Globulin   3.3   (2.2-3.9)  gm/dL


 


Albumin/Globulin Ratio   1.0   (1.0-2.1)  


 


Triglycerides   104   (0-149)  mg/dL


 


Cholesterol   115   (0-199)  mg/dL


 


LDL Cholesterol Direct   70   (0-129)  mg/dL


 


HDL Cholesterol   35   (30-70)  mg/dL














  11/24/17 11/23/17 Range/Units





  06:36 14:12 


 


WBC  5.7   (4.8-10.8)  K/uL


 


RBC  4.66   (4.40-5.90)  Mil/uL


 


Hgb  15.0   (12.0-18.0)  g/dL


 


Hct  44.0   (35.0-51.0)  %


 


MCV  94.4 H   (80.0-94.0)  fL


 


MCH  32.2 H   (27.0-31.0)  pg


 


MCHC  34.1   (33.0-37.0)  g/dL


 


RDW  13.5   (11.5-14.5)  %


 


Plt Count  187   (130-400)  K/uL


 


MPV  8.6   (7.2-11.7)  fL


 


Neut % (Auto)  65.1   (50.0-75.0)  %


 


Lymph % (Auto)  17.0 L   (20.0-40.0)  %


 


Mono % (Auto)  9.0   (0.0-10.0)  %


 


Eos % (Auto)  8.2 H   (0.0-4.0)  %


 


Baso % (Auto)  0.7   (0.0-2.0)  %


 


Neut #  3.7   (1.8-7.0)  K/uL


 


Lymph #  1.0   (1.0-4.3)  K/uL


 


Mono #  0.5   (0.0-0.8)  K/uL


 


Eos #  0.5   (0.0-0.7)  K/uL


 


Baso #  0.0   (0.0-0.2)  K/uL


 


APTT   66 H D  (21-34)  SECONDS


 


Sodium    (132-148)  mmol/L


 


Potassium    (3.6-5.2)  mmol/L


 


Chloride    ()  mmol/L


 


Carbon Dioxide    (22-30)  mmol/L


 


Anion Gap    (10-20)  


 


BUN    (9-20)  mg/dL


 


Creatinine    (0.8-1.5)  mg/dL


 


Est GFR (African Amer)    


 


Est GFR (Non-Af Amer)    


 


Random Glucose    ()  mg/dL


 


Hemoglobin A1c    (4.2-6.5)  %


 


Calcium    (8.6-10.4)  mg/dl


 


Phosphorus    (2.5-4.5)  mg/dL


 


Magnesium    (1.6-2.3)  mg/dL


 


Total Bilirubin    (0.2-1.3)  mg/dL


 


AST    (17-59)  U/L


 


ALT    (21-72)  U/L


 


Alkaline Phosphatase    ()  U/L


 


Total Creatine Kinase    ()  U/L


 


CK-MB (Mass)    (0.0-3.38)  ng/mL


 


Troponin I    (0.00-0.120)  ng/mL


 


Total Protein    (6.3-8.3)  g/dL


 


Albumin    (3.5-5.0)  g/dL


 


Globulin    (2.2-3.9)  gm/dL


 


Albumin/Globulin Ratio    (1.0-2.1)  


 


Triglycerides    (0-149)  mg/dL


 


Cholesterol    (0-199)  mg/dL


 


LDL Cholesterol Direct    (0-129)  mg/dL


 


HDL Cholesterol    (30-70)  mg/dL








 Laboratory Results - last 24 hr











  11/23/17 11/24/17 11/24/17





  14:12 06:36 06:36


 


WBC   5.7 


 


RBC   4.66 


 


Hgb   15.0 


 


Hct   44.0 


 


MCV   94.4 H 


 


MCH   32.2 H 


 


MCHC   34.1 


 


RDW   13.5 


 


Plt Count   187 


 


MPV   8.6 


 


Neut % (Auto)   65.1 


 


Lymph % (Auto)   17.0 L 


 


Mono % (Auto)   9.0 


 


Eos % (Auto)   8.2 H 


 


Baso % (Auto)   0.7 


 


Neut #   3.7 


 


Lymph #   1.0 


 


Mono #   0.5 


 


Eos #   0.5 


 


Baso #   0.0 


 


APTT  66 H D   158 H* D


 


Sodium   


 


Potassium   


 


Chloride   


 


Carbon Dioxide   


 


Anion Gap   


 


BUN   


 


Creatinine   


 


Est GFR ( Amer)   


 


Est GFR (Non-Af Amer)   


 


Random Glucose   


 


Hemoglobin A1c   


 


Calcium   


 


Phosphorus   


 


Magnesium   


 


Total Bilirubin   


 


AST   


 


ALT   


 


Alkaline Phosphatase   


 


Total Creatine Kinase   


 


CK-MB (Mass)   


 


Troponin I   


 


Total Protein   


 


Albumin   


 


Globulin   


 


Albumin/Globulin Ratio   


 


Triglycerides   


 


Cholesterol   


 


LDL Cholesterol Direct   


 


HDL Cholesterol   














  11/24/17 11/24/17





  06:36 09:45


 


WBC  


 


RBC  


 


Hgb  


 


Hct  


 


MCV  


 


MCH  


 


MCHC  


 


RDW  


 


Plt Count  


 


MPV  


 


Neut % (Auto)  


 


Lymph % (Auto)  


 


Mono % (Auto)  


 


Eos % (Auto)  


 


Baso % (Auto)  


 


Neut #  


 


Lymph #  


 


Mono #  


 


Eos #  


 


Baso #  


 


APTT  


 


Sodium  137 


 


Potassium  3.9 


 


Chloride  104 


 


Carbon Dioxide  25 


 


Anion Gap  12 


 


BUN  24 H 


 


Creatinine  1.7 H 


 


Est GFR ( Amer)  49 


 


Est GFR (Non-Af Amer)  41 


 


Random Glucose  105 


 


Hemoglobin A1c   5.7


 


Calcium  8.0 L 


 


Phosphorus  4.1 


 


Magnesium  1.3 L 


 


Total Bilirubin  0.3 


 


AST  19 


 


ALT  54 


 


Alkaline Phosphatase  57 


 


Total Creatine Kinase  32 L 


 


CK-MB (Mass)  2.01 


 


Troponin I  0.1060 


 


Total Protein  6.5 


 


Albumin  3.2 L 


 


Globulin  3.3 


 


Albumin/Globulin Ratio  1.0 


 


Triglycerides  104 


 


Cholesterol  115 


 


LDL Cholesterol Direct  70 


 


HDL Cholesterol  35 











EKG/Cardiology Studies: 


Cardiology / EKG Studies





11/24/17 07:15


EKG [ELECTROCARDIOGRAM] DAILY 


   Comment: 


   Mode Of Transportation: PORTABLE


   Reason For Exam: Dyspnea, PE





11/25/17 07:15


EKG [ELECTROCARDIOGRAM] DAILY 


   Comment: 


   Mode Of Transportation: PORTABLE


   Reason For Exam: Dyspnea, PE





11/26/17 07:15


EKG [ELECTROCARDIOGRAM] DAILY 


   Comment: 


   Mode Of Transportation: PORTABLE


   Reason For Exam: Dyspnea, PE














Review of Systems





- Review of Systems


All systems: reviewed and no additional remarkable complaints except (as per HPI

)





Critical Care Progress Note





- Nutrition


Nutrition: 


 Nutrition











 Category Date Time Status


 


 Heart Healthy Diet [DIET] Diets  11/23/17 Lunch Active














Assessment/Plan





- Assessment and Plan (Free Text)


Assessment: 





62 year old male with b/l pulmonary embolisms


Plan: 





Respiratory:


Chest CT angio: Large bilateral pulmonary emboli involving both the right and 

left main pulmonary arteries and subsequent distal branches. 4 mm nodule 

inferior left upper lobe. Recommend twelve month follow-up CT to assess for 

stability


Patient saturating at 99% on room air.


Started Eliquis 10mg BID for 7 days, then decrease to 5mg BID





CV:


Cardiology - Dr. Teague consulted, help appreciated


ECHO - awaiting official report


LE duplex - DVT in right popliteal vein. Left LE normal.


NS @75ml/hr





Hem/Onc:


Hem/Onc - Dr. Perez consulted, help appreciated 


* hypercoagulability eval





Prophylactic Care:


GI: Protonix 40mg PO daily


Folic Acid, Thiamine, multivitamins


Librium 25mg PO q8





Patient stable. Will transfer to telemetry.





Case discussed with Dr. Pa Tompkins PGY1





<Asha Winn - Last Filed: 11/24/17 15:08>





CCU Objective





- Vital Signs / Intake & Output


Vital Signs (Last 4 hours): 


Vital Signs











  Pulse Resp BP Pulse Ox


 


 11/24/17 12:00  107 H  16  139/99 H  91 L


 


 11/24/17 11:07  94 H  18  130/103 H 











Intake and Output (Last 8hrs): 


 Intake & Output











 11/24/17 11/24/17 11/24/17





 06:59 14:59 22:59


 


Intake Total 1085.2 99.4 


 


Output Total 900  


 


Balance 185.2 99.4 


 


Intake:   


 


   10 


 


  Intake, IV Amount 715.2 89.4 


 


    Right Antecubital 115.2 14.4 


 


    Right Distal Port 600 75 





    Antecubital   


 


  Oral 120  


 


Output:   


 


  Urine 900  


 


    Urine, Voided 900  


 


Other:   


 


  # Voids   


 


    Urine, Voided 1  














- Medications


Active Medications: 


Active Medications











Generic Name Dose Route Start Last Admin





  Trade Name Freq  PRN Reason Stop Dose Admin


 


Acetaminophen  650 mg  11/23/17 09:28  11/23/17 22:30





  Tylenol 325mg Tab  PO   650 mg





  Q6 PRN   Administration





  Pain, moderate (4-7)   


 


Apixaban  10 mg  11/24/17 10:00  11/24/17 09:55





  Eliquis  PO  11/30/17 18:01  10 mg





  BID ITA   Administration


 


Apixaban  5 mg  12/01/17 10:00  





  Eliquis  PO   





  BID ITA   


 


Chlordiazepoxide  25 mg  11/22/17 22:00  11/24/17 14:30





  Librium  PO   25 mg





  Q8 ITA   Administration


 


Folic Acid  1 mg  11/23/17 10:00  11/24/17 09:13





  Folic Acid  PO   1 mg





  DAILY ITA   Administration


 


Sodium Chloride  1,000 mls @ 75 mls/hr  11/23/17 09:00  11/24/17 14:30





  Sodium Chloride 0.9%  IV   75 mls/hr





  .[id number] TIA   Administration


 


Multivitamins  1 tab  11/23/17 10:00  11/24/17 09:13





  Hexavitamin  PO   1 tab





  DAILY ITA   Administration


 


Pantoprazole Sodium  40 mg  11/23/17 10:00  11/24/17 09:14





  Protonix Ec Tab  PO   40 mg





  DAILY ITA   Administration


 


Thiamine HCl  100 mg  11/22/17 21:10  11/24/17 09:14





  Vitamin B1 Tab  PO   100 mg





  DAILY ITA   Administration














- Patient Studies


Lab Studies: 


 Microbiology Studies











 11/22/17 22:47 MRSA Culture (Admit) - Final





 [facility]    MRSA NOT DETECTED








 Lab Studies











  11/24/17 11/24/17 11/24/17 Range/Units





  09:45 06:36 06:36 


 


WBC     (4.8-10.8)  K/uL


 


RBC     (4.40-5.90)  Mil/uL


 


Hgb     (12.0-18.0)  g/dL


 


Hct     (35.0-51.0)  %


 


MCV     (80.0-94.0)  fL


 


MCH     (27.0-31.0)  pg


 


MCHC     (33.0-37.0)  g/dL


 


RDW     (11.5-14.5)  %


 


Plt Count     (130-400)  K/uL


 


MPV     (7.2-11.7)  fL


 


Neut % (Auto)     (50.0-75.0)  %


 


Lymph % (Auto)     (20.0-40.0)  %


 


Mono % (Auto)     (0.0-10.0)  %


 


Eos % (Auto)     (0.0-4.0)  %


 


Baso % (Auto)     (0.0-2.0)  %


 


Neut #     (1.8-7.0)  K/uL


 


Lymph #     (1.0-4.3)  K/uL


 


Mono #     (0.0-0.8)  K/uL


 


Eos #     (0.0-0.7)  K/uL


 


Baso #     (0.0-0.2)  K/uL


 


APTT    158 H* D  (21-34)  SECONDS


 


Sodium   137   (132-148)  mmol/L


 


Potassium   3.9   (3.6-5.2)  mmol/L


 


Chloride   104   ()  mmol/L


 


Carbon Dioxide   25   (22-30)  mmol/L


 


Anion Gap   12   (10-20)  


 


BUN   24 H   (9-20)  mg/dL


 


Creatinine   1.7 H   (0.8-1.5)  mg/dL


 


Est GFR ( Amer)   49   


 


Est GFR (Non-Af Amer)   41   


 


Random Glucose   105   ()  mg/dL


 


Hemoglobin A1c  5.7    (4.2-6.5)  %


 


Calcium   8.0 L   (8.6-10.4)  mg/dl


 


Phosphorus   4.1   (2.5-4.5)  mg/dL


 


Magnesium   1.3 L   (1.6-2.3)  mg/dL


 


Total Bilirubin   0.3   (0.2-1.3)  mg/dL


 


AST   19   (17-59)  U/L


 


ALT   54   (21-72)  U/L


 


Alkaline Phosphatase   57   ()  U/L


 


Total Creatine Kinase   32 L   ()  U/L


 


CK-MB (Mass)   2.01   (0.0-3.38)  ng/mL


 


Troponin I   0.1060   (0.00-0.120)  ng/mL


 


Total Protein   6.5   (6.3-8.3)  g/dL


 


Albumin   3.2 L   (3.5-5.0)  g/dL


 


Globulin   3.3   (2.2-3.9)  gm/dL


 


Albumin/Globulin Ratio   1.0   (1.0-2.1)  


 


Triglycerides   104   (0-149)  mg/dL


 


Cholesterol   115   (0-199)  mg/dL


 


LDL Cholesterol Direct   70   (0-129)  mg/dL


 


HDL Cholesterol   35   (30-70)  mg/dL














  11/24/17 Range/Units





  06:36 


 


WBC  5.7  (4.8-10.8)  K/uL


 


RBC  4.66  (4.40-5.90)  Mil/uL


 


Hgb  15.0  (12.0-18.0)  g/dL


 


Hct  44.0  (35.0-51.0)  %


 


MCV  94.4 H  (80.0-94.0)  fL


 


MCH  32.2 H  (27.0-31.0)  pg


 


MCHC  34.1  (33.0-37.0)  g/dL


 


RDW  13.5  (11.5-14.5)  %


 


Plt Count  187  (130-400)  K/uL


 


MPV  8.6  (7.2-11.7)  fL


 


Neut % (Auto)  65.1  (50.0-75.0)  %


 


Lymph % (Auto)  17.0 L  (20.0-40.0)  %


 


Mono % (Auto)  9.0  (0.0-10.0)  %


 


Eos % (Auto)  8.2 H  (0.0-4.0)  %


 


Baso % (Auto)  0.7  (0.0-2.0)  %


 


Neut #  3.7  (1.8-7.0)  K/uL


 


Lymph #  1.0  (1.0-4.3)  K/uL


 


Mono #  0.5  (0.0-0.8)  K/uL


 


Eos #  0.5  (0.0-0.7)  K/uL


 


Baso #  0.0  (0.0-0.2)  K/uL


 


APTT   (21-34)  SECONDS


 


Sodium   (132-148)  mmol/L


 


Potassium   (3.6-5.2)  mmol/L


 


Chloride   ()  mmol/L


 


Carbon Dioxide   (22-30)  mmol/L


 


Anion Gap   (10-20)  


 


BUN   (9-20)  mg/dL


 


Creatinine   (0.8-1.5)  mg/dL


 


Est GFR (African Amer)   


 


Est GFR (Non-Af Amer)   


 


Random Glucose   ()  mg/dL


 


Hemoglobin A1c   (4.2-6.5)  %


 


Calcium   (8.6-10.4)  mg/dl


 


Phosphorus   (2.5-4.5)  mg/dL


 


Magnesium   (1.6-2.3)  mg/dL


 


Total Bilirubin   (0.2-1.3)  mg/dL


 


AST   (17-59)  U/L


 


ALT   (21-72)  U/L


 


Alkaline Phosphatase   ()  U/L


 


Total Creatine Kinase   ()  U/L


 


CK-MB (Mass)   (0.0-3.38)  ng/mL


 


Troponin I   (0.00-0.120)  ng/mL


 


Total Protein   (6.3-8.3)  g/dL


 


Albumin   (3.5-5.0)  g/dL


 


Globulin   (2.2-3.9)  gm/dL


 


Albumin/Globulin Ratio   (1.0-2.1)  


 


Triglycerides   (0-149)  mg/dL


 


Cholesterol   (0-199)  mg/dL


 


LDL Cholesterol Direct   (0-129)  mg/dL


 


HDL Cholesterol   (30-70)  mg/dL








 Laboratory Results - last 24 hr











  11/24/17 11/24/17 11/24/17





  06:36 06:36 06:36


 


WBC  5.7  


 


RBC  4.66  


 


Hgb  15.0  


 


Hct  44.0  


 


MCV  94.4 H  


 


MCH  32.2 H  


 


MCHC  34.1  


 


RDW  13.5  


 


Plt Count  187  


 


MPV  8.6  


 


Neut % (Auto)  65.1  


 


Lymph % (Auto)  17.0 L  


 


Mono % (Auto)  9.0  


 


Eos % (Auto)  8.2 H  


 


Baso % (Auto)  0.7  


 


Neut #  3.7  


 


Lymph #  1.0  


 


Mono #  0.5  


 


Eos #  0.5  


 


Baso #  0.0  


 


APTT   158 H* D 


 


Sodium    137


 


Potassium    3.9


 


Chloride    104


 


Carbon Dioxide    25


 


Anion Gap    12


 


BUN    24 H


 


Creatinine    1.7 H


 


Est GFR ( Amer)    49


 


Est GFR (Non-Af Amer)    41


 


Random Glucose    105


 


Hemoglobin A1c   


 


Calcium    8.0 L


 


Phosphorus    4.1


 


Magnesium    1.3 L


 


Total Bilirubin    0.3


 


AST    19


 


ALT    54


 


Alkaline Phosphatase    57


 


Total Creatine Kinase    32 L


 


CK-MB (Mass)    2.01


 


Troponin I    0.1060


 


Total Protein    6.5


 


Albumin    3.2 L


 


Globulin    3.3


 


Albumin/Globulin Ratio    1.0


 


Triglycerides    104


 


Cholesterol    115


 


LDL Cholesterol Direct    70


 


HDL Cholesterol    35














  11/24/17





  09:45


 


WBC 


 


RBC 


 


Hgb 


 


Hct 


 


MCV 


 


MCH 


 


MCHC 


 


RDW 


 


Plt Count 


 


MPV 


 


Neut % (Auto) 


 


Lymph % (Auto) 


 


Mono % (Auto) 


 


Eos % (Auto) 


 


Baso % (Auto) 


 


Neut # 


 


Lymph # 


 


Mono # 


 


Eos # 


 


Baso # 


 


APTT 


 


Sodium 


 


Potassium 


 


Chloride 


 


Carbon Dioxide 


 


Anion Gap 


 


BUN 


 


Creatinine 


 


Est GFR ( Amer) 


 


Est GFR (Non-Af Amer) 


 


Random Glucose 


 


Hemoglobin A1c  5.7


 


Calcium 


 


Phosphorus 


 


Magnesium 


 


Total Bilirubin 


 


AST 


 


ALT 


 


Alkaline Phosphatase 


 


Total Creatine Kinase 


 


CK-MB (Mass) 


 


Troponin I 


 


Total Protein 


 


Albumin 


 


Globulin 


 


Albumin/Globulin Ratio 


 


Triglycerides 


 


Cholesterol 


 


LDL Cholesterol Direct 


 


HDL Cholesterol 











EKG/Cardiology Studies: 


Cardiology / EKG Studies





11/24/17 07:15


EKG [ELECTROCARDIOGRAM] DAILY 


   Comment: 


   Mode Of Transportation: PORTABLE


   Reason For Exam: Dyspnea, PE





11/25/17 07:15


EKG [ELECTROCARDIOGRAM] DAILY 


   Comment: 


   Mode Of Transportation: PORTABLE


   Reason For Exam: Dyspnea, PE





11/26/17 07:15


EKG [ELECTROCARDIOGRAM] DAILY 


   Comment: 


   Mode Of Transportation: PORTABLE


   Reason For Exam: Dyspnea, PE














Critical Care Progress Note





- Nutrition


Nutrition: 


 Nutrition











 Category Date Time Status


 


 Heart Healthy Diet [DIET] Diets  11/23/17 Lunch Active














Assessment/Plan





- Assessment and Plan (Free Text)


Plan: 





Addendum:


Patient seen and examined at bedside. Patient saturating more than 92 on room 

air.


Eliquis is FDA indicated for PE.


-will start eliquis 


-Patient informed of the risk of bleeding and that benefits of AC more than the 

risk of bleeding.


Patient verbalized understanding. 


-heme/onc conult


-Patient advised to follow up with this PMD for age appropriate cancer 

screening. patient verbalized that he has apprehension and anxiety from 

undergoing invasive procesure.


Risks benefits and alternatives of age approproate screening explained. 





Patient remain hemodynamically stable room air

## 2017-12-05 NOTE — CARD
--------------- APPROVED REPORT --------------





EKG Measurement

Heart [id number]

OK [id number]

[id number]

[id number]

[id number]



<Conclusion>

Normal sinus rhythm

T wave abnormality, consider inferior ischemia

T wave abnormality, consider anterolateral ischemia

Prolonged QT

Abnormal ECG

## 2018-01-04 ENCOUNTER — HOSPITAL ENCOUNTER (OUTPATIENT)
Dept: HOSPITAL 31 - C.ER | Age: 63
Setting detail: OBSERVATION
LOS: 1 days | Discharge: HOME | End: 2018-01-05
Attending: INTERNAL MEDICINE | Admitting: INTERNAL MEDICINE
Payer: MEDICAID

## 2018-01-04 VITALS — BODY MASS INDEX: 31.3 KG/M2

## 2018-01-04 VITALS — RESPIRATION RATE: 20 BRPM

## 2018-01-04 DIAGNOSIS — I10: ICD-10-CM

## 2018-01-04 DIAGNOSIS — Z86.711: ICD-10-CM

## 2018-01-04 DIAGNOSIS — N20.0: Primary | ICD-10-CM

## 2018-01-04 LAB
ALBUMIN SERPL-MCNC: 4 G/DL (ref 3.5–5)
ALBUMIN/GLOB SERPL: 1.3 {RATIO} (ref 1–2.1)
ALT SERPL-CCNC: 40 U/L (ref 21–72)
APTT BLD: 35 SECONDS (ref 21–34)
AST SERPL-CCNC: 18 U/L (ref 17–59)
BASOPHILS # BLD AUTO: 0 K/UL (ref 0–0.2)
BASOPHILS NFR BLD: 0.5 % (ref 0–2)
BILIRUB UR-MCNC: NEGATIVE MG/DL
BUN SERPL-MCNC: 30 MG/DL (ref 9–20)
CALCIUM SERPL-MCNC: 9 MG/DL (ref 8.6–10.4)
EOSINOPHIL # BLD AUTO: 0.1 K/UL (ref 0–0.7)
EOSINOPHIL NFR BLD: 1.1 % (ref 0–4)
ERYTHROCYTE [DISTWIDTH] IN BLOOD BY AUTOMATED COUNT: 13.1 % (ref 11.5–14.5)
GFR NON-AFRICAN AMERICAN: 34
GLUCOSE UR STRIP-MCNC: NORMAL MG/DL
HGB BLD-MCNC: 14.6 G/DL (ref 12–18)
INR PPP: 1.3
LEUKOCYTE ESTERASE UR-ACNC: (no result) LEU/UL
LIPASE: 72 U/L (ref 23–300)
LYMPHOCYTE: 5 % (ref 20–40)
LYMPHOCYTES # BLD AUTO: 0.7 K/UL (ref 1–4.3)
LYMPHOCYTES NFR BLD AUTO: 6.8 % (ref 20–40)
MCH RBC QN AUTO: 30.4 PG (ref 27–31)
MCHC RBC AUTO-ENTMCNC: 33.6 G/DL (ref 33–37)
MCV RBC AUTO: 90.6 FL (ref 80–94)
MONOCYTE: 6 % (ref 0–10)
MONOCYTES # BLD: 0.5 K/UL (ref 0–0.8)
MONOCYTES NFR BLD: 5.1 % (ref 0–10)
NEUTROPHILS # BLD: 8.4 K/UL (ref 1.8–7)
NEUTROPHILS NFR BLD AUTO: 86.5 % (ref 50–75)
NEUTROPHILS NFR BLD AUTO: 89 % (ref 50–75)
NRBC BLD AUTO-RTO: 0.1 % (ref 0–2)
PH UR STRIP: 5 [PH] (ref 5–8)
PLATELET # BLD EST: NORMAL 10*3/UL
PLATELET # BLD: 252 K/UL (ref 130–400)
PMV BLD AUTO: 9.2 FL (ref 7.2–11.7)
PROT UR STRIP-MCNC: NEGATIVE MG/DL
PROTHROMBIN TIME: 15.2 SECONDS (ref 9.7–12.2)
RBC # BLD AUTO: 4.79 MIL/UL (ref 4.4–5.9)
RBC # UR STRIP: NEGATIVE /UL
SP GR UR STRIP: 1.02 (ref 1–1.03)
SQUAMOUS EPITHIAL: < 1 /HPF (ref 0–5)
TOTAL CELLS COUNTED BLD: 100
URINE NITRATE: NEGATIVE
UROBILINOGEN UR-MCNC: NORMAL MG/DL (ref 0.2–1)
WBC # BLD AUTO: 9.7 K/UL (ref 4.8–10.8)

## 2018-01-04 PROCEDURE — 74176 CT ABD & PELVIS W/O CONTRAST: CPT

## 2018-01-04 PROCEDURE — 81001 URINALYSIS AUTO W/SCOPE: CPT

## 2018-01-04 PROCEDURE — 85730 THROMBOPLASTIN TIME PARTIAL: CPT

## 2018-01-04 PROCEDURE — 99284 EMERGENCY DEPT VISIT MOD MDM: CPT

## 2018-01-04 PROCEDURE — 84484 ASSAY OF TROPONIN QUANT: CPT

## 2018-01-04 PROCEDURE — 96360 HYDRATION IV INFUSION INIT: CPT

## 2018-01-04 PROCEDURE — 80053 COMPREHEN METABOLIC PANEL: CPT

## 2018-01-04 PROCEDURE — 96374 THER/PROPH/DIAG INJ IV PUSH: CPT

## 2018-01-04 PROCEDURE — 83690 ASSAY OF LIPASE: CPT

## 2018-01-04 PROCEDURE — 36415 COLL VENOUS BLD VENIPUNCTURE: CPT

## 2018-01-04 PROCEDURE — 93005 ELECTROCARDIOGRAM TRACING: CPT

## 2018-01-04 PROCEDURE — 85610 PROTHROMBIN TIME: CPT

## 2018-01-04 PROCEDURE — 85025 COMPLETE CBC W/AUTO DIFF WBC: CPT

## 2018-01-04 RX ADMIN — DEXTROSE AND SODIUM CHLORIDE SCH MLS/HR: 5; 450 INJECTION, SOLUTION INTRAVENOUS at 22:53

## 2018-01-04 NOTE — C.PDOC
History Of Present Illness


62 year old male with a past medical history of hypertension and pulmonary 

embolism (on Eliquis), presents to the emergency department complaining of 

abdominal pain that began a few hours ago. Pain is described as diffuse 

cramping. Patient also complains of nausea, but no vomiting or diarrhea. No 

other complaints at this time. 





Time Seen by Provider: 01/04/18 20:21


Chief Complaint (Nursing): Abdominal Pain


History Per: Patient


History/Exam Limitations: no limitations


Onset/Duration Of Symptoms: Hrs (x3)


Current Symptoms Are (Timing): Still Present


Associated Symptoms: Nausea





Past Medical History


Reviewed: Historical Data, Nursing Documentation, Vital Signs


Vital Signs: 


 Last Vital Signs











Temp  98.1 F   01/05/18 08:05


 


Pulse  70   01/05/18 08:05


 


Resp  20   01/05/18 08:05


 


BP  145/92 H  01/05/18 08:05


 


Pulse Ox  99   01/05/18 08:05














- Medical History


PMH: HTN, Pulmonary Embolism


   Denies: Chronic Kidney Disease


Family History: States: Unknown Family Hx





- Social History


Hx Alcohol Use: No


Hx Substance Use: No





- Immunization History


Hx Tetanus Toxoid Vaccination: No


Hx Influenza Vaccination: No


Hx Pneumococcal Vaccination: No





Review Of Systems


Except As Marked, All Systems Reviewed And Found Negative.


Constitutional: Negative for: Fever


Gastrointestinal: Positive for: Nausea, Abdominal Pain.  Negative for: Vomiting

, Diarrhea, Hematochezia





Physical Exam





- Physical Exam


Appears: Non-toxic, No Acute Distress


Skin: Normal Color, Warm, Dry


Head: Atraumatic, Normacephalic


Eye(s): bilateral: Normal Inspection, PERRL, EOMI


Nose: Normal


Neck: Normal, Normal ROM, Supple


Chest: Symmetrical


Cardiovascular: Rhythm Regular, No Murmur


Respiratory: Normal Breath Sounds, No Accessory Muscle Use


Gastrointestinal/Abdominal: Soft, Tenderness (Mild non-focal tenderness), No 

Guarding, No Rebound


Back: Normal Inspection, No CVA Tenderness, No Vertebral Tenderness


Extremity: Normal ROM, No Pedal Edema, No Deformity


Neurological/Psych: Oriented x3, Normal Speech





ED Course And Treatment





- Laboratory Results


Result Diagrams: 


 01/05/18 13:47





 01/05/18 13:47


ECG: Interpreted By Me, Viewed By Me


Interpretation Of ECG: Sinus rhythm at 77 bpm, no ST/T changes


O2 Sat by Pulse Oximetry: 100 (RA)


Pulse Ox Interpretation: Normal





Medical Decision Making


Medical Decision Making: 





Time: 20:30


Initial Impression: 63 y/o male with abdominal cramping and nausea


Initial Plan:


* EKG


* Labs


* Urinalysis 


* Protonix 40 mg IVP


* Sodium chloride  ml at 1000 mls/hr


* Zofran 4 mg IVP


* Reevaluation





multiple rounds of antiemetics . pt uncomfortable. with dc. discussed with 

apoorva, accepts for admission. 





Disposition





- Disposition


Disposition: HOSPITALIZED


Disposition Time: 23:12


Condition: STABLE





- Clinical Impression


Clinical Impression: 


 Kidney stone








- Scribe Statement


The provider has reviewed the documentation as recorded by the Scribe (Ce Torres)





All medical record entries made by the Scribe were at my direction and 

personally dictated by me. I have reviewed the chart and agree that the record 

accurately reflects my personal performance of the history, physical exam, 

medical decision making, and the department course for this patient. I have 

also personally directed, reviewed, and agree with the discharge instructions 

and disposition.





Decision To Admit





- Pt Status Changed To:


Hospital Disposition Of: Observation





- .


Bed Request Type: Regular


Admitting Physician: Alpa Nolen


Patient Diagnosis: 


 Kidney stone

## 2018-01-04 NOTE — CT
EXAM:

  CT Abdomen and Pelvis Without Intravenous Contrast



CLINICAL HISTORY:

  62 years old, male; Pain; Abdominal pain; Generalized; Additional info: Abd 

pain, vomiting



TECHNIQUE:

  Axial computed tomography images of the abdomen and pelvis without 

intravenous contrast.  All CT scans at this facility use one or more dose 

reduction techniques, viz.: automated exposure control; ma/kV adjustment per 

patient size (including targeted exams where dose is matched to indication; 

i.e. head); or iterative reconstruction technique.

  Coronal and sagittal reformatted images were created and reviewed.



COMPARISON:

  No relevant prior studies available.



FINDINGS:

  Lower thorax:  Minimal atelectasis/scarring.  Coronary artery calcifications.



 ABDOMEN:

  Liver:  Few low-attenuation lesions with benign imaging features.

  Gallbladder and bile ducts:  No calcified stones.  No ductal dilation.

  Pancreas:  Unremarkable.  No ductal dilation.

  Spleen:  No splenomegaly.

  Adrenals:  No mass.

  Kidneys and ureters:  Mild stranding about kidneys, nonspecific.  No renal 

calculi.  Mild-to-moderate pelvocaliectasis of LEFT kidney.  Moderately dilated 

LEFT ureter.  0.2 x 0.2 x 0.2 cm calculus at or just beyond LEFT ureterovesical 

junction.

  Stomach and bowel:  Scattered diverticula within colon.  No associated 

inflammatory stranding.  No definite mural thickening.  No obstruction.

  Appendix:  Normal caliber.  No inflammation.



 PELVIS:

  Bladder:  Unremarkable.  No stones.

  Reproductive:  Unremarkable as visualized.



 ABDOMEN and PELVIS:

  Intraperitoneal space:  No significant fluid collection.  No free air.

  Bones/joints:  Mild ossification about left lower ribs.  Degenerative changes 

and scoliosis of spine.  Mild enthesopathy.  No acute fracture.

  Soft tissues:  Small inguinal hernias containing fat.  Tiny umbilical hernia 

containing fat.

  Vasculature:  Mild atherosclerotic disease.  No aneurysm.

  Lymph nodes:  No pathologically enlarged lymph nodes.



IMPRESSION:     

1.  LEFT distal ureteral calculus with mild-to-moderate hydroureteronephrosis.  

Scattered diverticula within colon.

2.  Liver lesions.  No follow-up is necessary.

3.  Incidental/non-acute findings are described above.

## 2018-01-05 VITALS — DIASTOLIC BLOOD PRESSURE: 92 MMHG | HEART RATE: 70 BPM | SYSTOLIC BLOOD PRESSURE: 145 MMHG | TEMPERATURE: 98.1 F

## 2018-01-05 LAB
ALBUMIN SERPL-MCNC: 3.1 G/DL (ref 3.5–5)
ALBUMIN/GLOB SERPL: 1.2 {RATIO} (ref 1–2.1)
ALT SERPL-CCNC: 29 U/L (ref 21–72)
AST SERPL-CCNC: 14 U/L (ref 17–59)
BASOPHILS # BLD AUTO: 0 K/UL (ref 0–0.2)
BASOPHILS NFR BLD: 0.7 % (ref 0–2)
BUN SERPL-MCNC: 29 MG/DL (ref 9–20)
CALCIUM SERPL-MCNC: 7.9 MG/DL (ref 8.6–10.4)
EOSINOPHIL # BLD AUTO: 0.2 K/UL (ref 0–0.7)
EOSINOPHIL NFR BLD: 3 % (ref 0–4)
ERYTHROCYTE [DISTWIDTH] IN BLOOD BY AUTOMATED COUNT: 13.1 % (ref 11.5–14.5)
GFR NON-AFRICAN AMERICAN: 41
HGB BLD-MCNC: 13.4 G/DL (ref 12–18)
LYMPHOCYTES # BLD AUTO: 1 K/UL (ref 1–4.3)
LYMPHOCYTES NFR BLD AUTO: 15.8 % (ref 20–40)
MCH RBC QN AUTO: 31.8 PG (ref 27–31)
MCHC RBC AUTO-ENTMCNC: 35.3 G/DL (ref 33–37)
MCV RBC AUTO: 90.1 FL (ref 80–94)
MONOCYTES # BLD: 0.6 K/UL (ref 0–0.8)
MONOCYTES NFR BLD: 9.2 % (ref 0–10)
NEUTROPHILS # BLD: 4.6 K/UL (ref 1.8–7)
NEUTROPHILS NFR BLD AUTO: 71.3 % (ref 50–75)
NRBC BLD AUTO-RTO: 0 % (ref 0–2)
PLATELET # BLD: 210 K/UL (ref 130–400)
PMV BLD AUTO: 9.2 FL (ref 7.2–11.7)
RBC # BLD AUTO: 4.21 MIL/UL (ref 4.4–5.9)
WBC # BLD AUTO: 6.4 K/UL (ref 4.8–10.8)

## 2018-01-05 RX ADMIN — DEXTROSE AND SODIUM CHLORIDE SCH: 5; 450 INJECTION, SOLUTION INTRAVENOUS at 18:56

## 2018-01-05 RX ADMIN — DEXTROSE AND SODIUM CHLORIDE SCH MLS/HR: 5; 450 INJECTION, SOLUTION INTRAVENOUS at 10:02

## 2018-01-05 NOTE — CARD
--------------- APPROVED REPORT --------------





EKG Measurement

Heart [id number]

AR [id number]

[id number]

[id number]

[id number]



<Conclusion>

Normal sinus rhythm

Normal ECG

## 2018-01-05 NOTE — CP.PCM.PN
Subjective





- Date & Time of Evaluation


Date of Evaluation: 01/05/18


Time of Evaluation: 10:00





- Subjective


Subjective: 











Dr. Nolen note:





Patient is seen and examined in room.  Patient says he was having some 

discomfort when urinating and also abdominal pain and back pain as well.  He 

says his symptoms have resolved and feels much better.  He denies any pain, 

nausea, vomiting, or dysuria. 





Objective





- Vital Signs/Intake and Output


Vital Signs (last 24 hours): 


 











Temp Pulse Resp BP Pulse Ox


 


 98.1 F   70   20   145/92 H  99 


 


 01/05/18 08:05  01/05/18 08:05  01/05/18 08:05  01/05/18 08:05  01/05/18 08:05








Intake and Output: 


 











 01/05/18 01/05/18





 06:59 18:59


 


Intake Total 850 


 


Output Total 550 


 


Balance 300 














- Medications


Medications: 


 Current Medications





Apixaban (Eliquis)  5 mg PO BID [facility]


   Last Admin: 01/04/18 23:03 Dose:  5 mg


Hydromorphone HCl (Dilaudid)  1 mg IVP Q4H PRN


   PRN Reason: Pain, severe (8-10)


Dextrose/Sodium Chloride (Dextrose 5%/0.45% Ns 1000 Ml)  1,000 mls @ 100 mls/hr 

IV .Q10H [facility]


   Last Admin: 01/04/18 22:53 Dose:  100 mls/hr


Lisinopril (Zestril)  20 mg PO DAILY [facility]


Ondansetron HCl (Zofran Inj)  4 mg IVP Q6 PRN


   PRN Reason: Nausea/Vomiting


Pneumococcal Polyvalent Vaccine (Pneumovax 23 Vaccine)  0.5 ml IM .ONCE ONE


   Stop: 01/07/18 10:01











- Labs


Labs: 


 





 01/04/18 20:44 





 01/04/18 20:44 





 











PT  15.2 SECONDS (9.7-12.2)  H  01/04/18  20:44    


 


INR  1.3   01/04/18  20:44    


 


APTT  35 SECONDS (21-34)  H  01/04/18  20:44    














- Constitutional


Appears: Non-toxic, No Acute Distress





- Eye Exam


Eye Exam: Normal appearance, PERRL.  absent: Scleral icterus


Pupil Exam: NORMAL ACCOMODATION





- ENT Exam


ENT Exam: Normal Exam





- Respiratory Exam


Respiratory Exam: Clear to Ausculation Bilateral.  absent: Rales, Rhonchi, 

Wheezes





- Cardiovascular Exam


Cardiovascular Exam: REGULAR RHYTHM, RRR, +S1, +S2.  absent: Gallop, Rubs





- GI/Abdominal Exam


GI & Abdominal Exam: Soft, Normal Bowel Sounds.  absent: Distended, Guarding, 

Tenderness





- Extremities Exam


Extremities Exam: Normal Inspection.  absent: Pedal Edema





- Back Exam


Back Exam: NORMAL INSPECTION.  absent: CVA tenderness (L), CVA tenderness (R)





- Psychiatric Exam


Psychiatric exam: Normal Affect, Normal Mood





- Skin


Skin Exam: Normal Color, Warm





Assessment and Plan


(1) Kidney stone


Assessment & Plan: 


Kidney stone is too small for any stent placement by Urology consult.  Will 

discharge patient home today per Dr. Downey. 


Status: Acute   





(2) Pulmonary emboli


Assessment & Plan: 


He will need to continue home Eliquis. 


Status: Acute   





(3) HTN (hypertension)


Assessment & Plan: 


continue current home medication. 


Status: Acute   





(4) Prophylactic measure


Assessment & Plan: 


patient to be discharged home. 


Status: Acute

## 2018-01-07 VITALS — OXYGEN SATURATION: 100 %
